# Patient Record
Sex: FEMALE | Race: WHITE | NOT HISPANIC OR LATINO | Employment: FULL TIME | URBAN - METROPOLITAN AREA
[De-identification: names, ages, dates, MRNs, and addresses within clinical notes are randomized per-mention and may not be internally consistent; named-entity substitution may affect disease eponyms.]

---

## 2017-03-03 ENCOUNTER — TELEPHONE (OUTPATIENT)
Dept: OBSTETRICS AND GYNECOLOGY | Age: 47
End: 2017-03-03

## 2017-05-08 ENCOUNTER — PROCEDURE VISIT (OUTPATIENT)
Dept: OBSTETRICS AND GYNECOLOGY | Age: 47
End: 2017-05-08

## 2017-05-08 ENCOUNTER — OFFICE VISIT (OUTPATIENT)
Dept: OBSTETRICS AND GYNECOLOGY | Age: 47
End: 2017-05-08

## 2017-05-08 VITALS
BODY MASS INDEX: 32.47 KG/M2 | SYSTOLIC BLOOD PRESSURE: 130 MMHG | HEIGHT: 66 IN | WEIGHT: 202 LBS | DIASTOLIC BLOOD PRESSURE: 88 MMHG

## 2017-05-08 DIAGNOSIS — Z30.431 IUD CHECK UP: Primary | ICD-10-CM

## 2017-05-08 DIAGNOSIS — Z30.430 ENCOUNTER FOR IUD INSERTION: Primary | ICD-10-CM

## 2017-05-08 DIAGNOSIS — N39.41 URGE INCONTINENCE: ICD-10-CM

## 2017-05-08 DIAGNOSIS — Z30.432 ENCOUNTER FOR IUD REMOVAL: ICD-10-CM

## 2017-05-08 LAB
B-HCG UR QL: NEGATIVE
INTERNAL NEGATIVE CONTROL: NEGATIVE
INTERNAL POSITIVE CONTROL: POSITIVE
Lab: NORMAL

## 2017-05-08 PROCEDURE — 99212 OFFICE O/P EST SF 10 MIN: CPT | Performed by: OBSTETRICS & GYNECOLOGY

## 2017-05-08 PROCEDURE — 58301 REMOVE INTRAUTERINE DEVICE: CPT | Performed by: OBSTETRICS & GYNECOLOGY

## 2017-05-08 PROCEDURE — 81025 URINE PREGNANCY TEST: CPT | Performed by: OBSTETRICS & GYNECOLOGY

## 2017-05-08 PROCEDURE — 58300 INSERT INTRAUTERINE DEVICE: CPT | Performed by: OBSTETRICS & GYNECOLOGY

## 2017-05-08 PROCEDURE — 76830 TRANSVAGINAL US NON-OB: CPT | Performed by: OBSTETRICS & GYNECOLOGY

## 2017-05-08 RX ORDER — TOLTERODINE 4 MG/1
4 CAPSULE, EXTENDED RELEASE ORAL DAILY
Qty: 30 CAPSULE | Refills: 2 | Status: SHIPPED | OUTPATIENT
Start: 2017-05-08 | End: 2018-04-04

## 2017-09-08 ENCOUNTER — OFFICE VISIT (OUTPATIENT)
Dept: OBSTETRICS AND GYNECOLOGY | Age: 47
End: 2017-09-08

## 2017-09-08 VITALS
WEIGHT: 203 LBS | BODY MASS INDEX: 32.62 KG/M2 | SYSTOLIC BLOOD PRESSURE: 140 MMHG | HEIGHT: 66 IN | DIASTOLIC BLOOD PRESSURE: 98 MMHG

## 2017-09-08 DIAGNOSIS — B96.89 BV (BACTERIAL VAGINOSIS): ICD-10-CM

## 2017-09-08 DIAGNOSIS — Z01.419 ENCOUNTER FOR GYNECOLOGICAL EXAMINATION WITHOUT ABNORMAL FINDING: Primary | ICD-10-CM

## 2017-09-08 DIAGNOSIS — N76.0 BV (BACTERIAL VAGINOSIS): ICD-10-CM

## 2017-09-08 PROCEDURE — 99396 PREV VISIT EST AGE 40-64: CPT | Performed by: OBSTETRICS & GYNECOLOGY

## 2017-09-08 RX ORDER — METRONIDAZOLE 500 MG/1
500 TABLET ORAL 2 TIMES DAILY
Qty: 14 TABLET | Refills: 0 | Status: SHIPPED | OUTPATIENT
Start: 2017-09-08 | End: 2017-09-15

## 2017-09-08 NOTE — PROGRESS NOTES
Routine Annual Visit    2017    Patient: Makenzie Justin          MR#:[id number]      Chief Complaint   Patient presents with   • Annual Exam     PT HERE FOR ROUTINE ANNUAL EXAM, HAD MG IN 2017. SHE IS DOING WELL WITH NO COMPLAINTS. LIKES LILETTA. LAST PAP  (NEG AND NEG HPV).       History of Present Illness    47 y.o. female  who presents for annual exam.   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 Patient is not currently having periods (Reason: Other).  Obstetric History:  OB History      Para Term  AB TAB SAB Ectopic Multiple Living    6 1 1  2  1   1         Menstrual History:     No LMP recorded. Patient is not currently having periods (Reason: Other).       Sexual History:       ________________________________________  Patient Active Problem List   Diagnosis   • Dysthymia   • Hyperglycemia       Past Medical History:   Diagnosis Date   • Allergic rhinitis    • Anxiety    • Depression    • GERD (gastroesophageal reflux disease)    • History of miscarriage    • Menorrhagia    • PMS (premenstrual syndrome)    • Stress at home    • Stress at work    • Vaginal delivery        Past Surgical History:   Procedure Laterality Date   • DILATATION AND CURETTAGE         History   Smoking Status   • Never Smoker   Smokeless Tobacco   • Never Used       has a current medication list which includes the following prescription(s): amoxicillin, benzonatate, cetirizine, escitalopram, fluvirin, guaifenesin-codeine, metronidazole, omeprazole magnesium, and tolterodine la, and the following Facility-Administered Medications: levonorgestrel.  ________________________________________    Current contraception: IUD  History of abnormal Pap smear: no  Family history of Breast cancer:   Family history of uterine or ovarian cancer: no  Family History of colon cancer/colon  "polyps: no  History of abnormal mammogram: no      The following portions of the patient's history were reviewed and updated as appropriate: allergies, current medications, past family history, past medical history, past social history, past surgical history and problem list.    Review of Systems    Pertinent items are noted in HPI.     Objective   Physical Exam    /98  Ht 66\" (167.6 cm)  Wt 203 lb (92.1 kg)  LMP Comment: LYNETTE  BMI 32.77 kg/m2   BP Readings from Last 3 Encounters:   09/08/17 140/98   05/08/17 130/88   12/20/16 124/80      Wt Readings from Last 3 Encounters:   09/08/17 203 lb (92.1 kg)   05/08/17 202 lb (91.6 kg)   12/20/16 186 lb (84.4 kg)      BMI: Estimated body mass index is 32.77 kg/(m^2) as calculated from the following:    Height as of this encounter: 66\" (167.6 cm).    Weight as of this encounter: 203 lb (92.1 kg).      General:   alert, appears stated age and cooperative   Abdomen: soft, non-tender, without masses or organomegaly   Breast: inspection negative, no nipple discharge or bleeding, no masses or nodularity palpable   Vulva: normal   Vagina: normal mucosa, ph normal, discharge appears normal, no odor   Cervix: no cervical motion tenderness and no lesions   Uterus: normal size, mobile or non-tender   Adnexa: no mass, fullness, tenderness     Assessment:    1. Normal annual exam   Assessment     ICD-10-CM ICD-9-CM   1. Encounter for gynecological examination without abnormal finding Z01.419 V72.31   2. BV (bacterial vaginosis) N76.0 616.10    B96.89 041.9     Plan:    Plan     []  Mammogram request made  []  PAP done  []  Labs:   []  GC/Chl/TV  []  DEXA scan   []  Referral for colonoscopy:       Makenzie was seen today for annual exam.    Diagnoses and all orders for this visit:    Encounter for gynecological examination without abnormal finding    BV (bacterial vaginosis)  -     metroNIDAZOLE (FLAGYL) 500 MG tablet; Take 1 tablet by mouth 2 (Two) Times a Day for 7 " days.            Counseling:  --Nutrition: Stressed importance of moderation and caloric balance, stressed fresh fruit and vegetables  --Exercise: Stressed the importance of regular exercise. 3-5 times weekly   - Discussed screening mammogram recommendations.   --Discussed benefits of screening colonoscopy- age 50 unless FH  --Discussed pap smear screening recommendations

## 2017-12-19 DIAGNOSIS — F34.1 DYSTHYMIA: ICD-10-CM

## 2017-12-19 RX ORDER — ESCITALOPRAM OXALATE 10 MG/1
TABLET ORAL
Qty: 30 TABLET | Refills: 0 | Status: SHIPPED | OUTPATIENT
Start: 2017-12-19 | End: 2017-12-22 | Stop reason: SDUPTHER

## 2017-12-22 DIAGNOSIS — F34.1 DYSTHYMIA: ICD-10-CM

## 2017-12-22 RX ORDER — ESCITALOPRAM OXALATE 10 MG/1
10 TABLET ORAL DAILY
Qty: 90 TABLET | Refills: 0 | Status: SHIPPED | OUTPATIENT
Start: 2017-12-22 | End: 2018-04-23 | Stop reason: SDUPTHER

## 2018-04-04 ENCOUNTER — OFFICE VISIT (OUTPATIENT)
Dept: SPORTS MEDICINE | Facility: CLINIC | Age: 48
End: 2018-04-04

## 2018-04-04 VITALS
HEART RATE: 89 BPM | WEIGHT: 196.8 LBS | HEIGHT: 66 IN | OXYGEN SATURATION: 96 % | DIASTOLIC BLOOD PRESSURE: 78 MMHG | BODY MASS INDEX: 31.63 KG/M2 | TEMPERATURE: 98.6 F | SYSTOLIC BLOOD PRESSURE: 136 MMHG

## 2018-04-04 DIAGNOSIS — Z91.09 ENVIRONMENTAL ALLERGIES: ICD-10-CM

## 2018-04-04 DIAGNOSIS — Z00.00 PREVENTATIVE HEALTH CARE: Primary | ICD-10-CM

## 2018-04-04 PROCEDURE — 99396 PREV VISIT EST AGE 40-64: CPT | Performed by: FAMILY MEDICINE

## 2018-04-04 RX ORDER — FEXOFENADINE HCL AND PSEUDOEPHEDRINE HCI 180; 240 MG/1; MG/1
1 TABLET, EXTENDED RELEASE ORAL DAILY
COMMUNITY
End: 2019-10-23

## 2018-04-04 NOTE — PROGRESS NOTES
"Makenzie Justin is here today for an annual physical exam.       - Has noted over past few years problems with perineal allergies. OTC       I have reviewed the patient's medical, family, and social history in detail and updated the computerized patient record.    Screening history:  Colonoscopy - n/a  Breast cancer, Pap/pelvic - per GYN  Metabolic - last year    Health Maintenance   Topic Date Due   • PAP SMEAR  03/03/2017   • INFLUENZA VACCINE  08/01/2017   • TDAP/TD VACCINES (2 - Td) 01/01/2020       Review of Systems   Constitutional: Negative for activity change, appetite change, chills, diaphoresis, fatigue, fever and unexpected weight change.   HENT: Positive for postnasal drip, rhinorrhea and sinus pressure. Negative for congestion, ear pain, sneezing, sore throat and trouble swallowing.    Eyes: Negative for visual disturbance.   Respiratory: Negative for cough, chest tightness, shortness of breath and wheezing.    Cardiovascular: Negative for chest pain and palpitations.   Gastrointestinal: Negative for abdominal pain, blood in stool, nausea and vomiting.   Endocrine: Negative for cold intolerance, polydipsia, polyphagia and polyuria.   Genitourinary: Negative for dysuria, flank pain, frequency, hematuria and urgency.   Musculoskeletal: Negative for arthralgias, back pain, joint swelling and myalgias.   Skin: Negative for rash.   Allergic/Immunologic: Negative for environmental allergies.   Neurological: Negative for dizziness, syncope, weakness, numbness and headaches.   Hematological: Negative for adenopathy. Does not bruise/bleed easily.   Psychiatric/Behavioral: Negative for agitation, decreased concentration, dysphoric mood, sleep disturbance and suicidal ideas. The patient is not nervous/anxious.        /78 (BP Location: Right arm, Patient Position: Sitting, Cuff Size: Adult)   Pulse 89   Temp 98.6 °F (37 °C) (Oral)   Ht 167.6 cm (65.98\")   Wt 89.3 kg (196 lb 12.8 oz)   SpO2 96%   BMI " 31.78 kg/m²      Physical Exam    Vital signs reviewed.  General appearance: No acute distress  Eyes: conjunctiva clear without erythema; pupils equally round and reactive  ENT: external ears and nose normal; hearing normal, oropharynx clear, changes of allergies.   Neck: supple; no thyromegaly  CV: normal rate and rhythm; no peripheral edema  Respiratory: normal respiratory effort; lungs clear to auscultation bilaterally  MSK: normal gait and station; no focal joint deformity or swelling  Skin: no rash or wounds; normal turgor  Neuro: cranial nerves 2-12 grossly intact; normal sensation to light touch  Psych: mood and affect normal; recent and remote memory intact    No visits with results within 2 Week(s) from this visit.   Latest known visit with results is:   Office Visit on 05/08/2017   Component Date Value Ref Range Status   • HCG, Urine, QL 05/08/2017 Negative  Negative Final   • Lot Number 05/08/2017 [id number]   Final   • Internal Positive Control 05/08/2017 Positive   Final   • Internal Negative Control 05/08/2017 Negative   Final       Makenzie was seen today for annual exam.    Diagnoses and all orders for this visit:    Preventative health care  -     Hemoglobin A1c  -     CBC & Differential  -     Comprehensive Metabolic Panel  -     Lipid Panel With / Chol / HDL Ratio  -     T4, Free  -     TSH  -     UA / M With / Rflx Culture([facility] ONLY) - Urine, Clean Catch    Environmental allergies  -     Ambulatory Referral to Allergy        Encourage healthy diet and exercise.  Encourage patient to stay up to date on screening examinations as indicated based on age and risk factors.    EMR Dragon/Transcription disclaimer:    Much of this encounter note is an electronic transcription/translation of spoken language to printed text.  The electronic translation of spoken language may permit erroneous, or at times, nonsensical words or phrases to be inadvertently transcribed.  Although I have reviewed the note for  such errors some may still exist.

## 2018-04-06 LAB
ALBUMIN SERPL-MCNC: 4.7 G/DL (ref 3.5–5.2)
ALBUMIN/GLOB SERPL: 2 G/DL
ALP SERPL-CCNC: 126 U/L (ref 39–117)
ALT SERPL-CCNC: 40 U/L (ref 1–33)
APPEARANCE UR: ABNORMAL
AST SERPL-CCNC: 22 U/L (ref 1–32)
BACTERIA #/AREA URNS HPF: ABNORMAL /HPF
BACTERIA UR CULT: NORMAL
BACTERIA UR CULT: NORMAL
BASOPHILS # BLD AUTO: 0.02 10*3/MM3 (ref 0–0.2)
BASOPHILS NFR BLD AUTO: 0.2 % (ref 0–1.5)
BILIRUB SERPL-MCNC: 0.4 MG/DL (ref 0.1–1.2)
BILIRUB UR QL STRIP: NEGATIVE
BUN SERPL-MCNC: 13 MG/DL (ref 6–20)
BUN/CREAT SERPL: 14.1 (ref 7–25)
CALCIUM SERPL-MCNC: 9.5 MG/DL (ref 8.6–10.5)
CHLORIDE SERPL-SCNC: 103 MMOL/L (ref 98–107)
CHOLEST SERPL-MCNC: 219 MG/DL (ref 0–200)
CHOLEST/HDLC SERPL: 4.87 {RATIO}
CO2 SERPL-SCNC: 29.6 MMOL/L (ref 22–29)
COLOR UR: YELLOW
CREAT SERPL-MCNC: 0.92 MG/DL (ref 0.57–1)
EOSINOPHIL # BLD AUTO: 0.08 10*3/MM3 (ref 0–0.7)
EOSINOPHIL NFR BLD AUTO: 0.9 % (ref 0.3–6.2)
EPI CELLS #/AREA URNS HPF: >10 /HPF
ERYTHROCYTE [DISTWIDTH] IN BLOOD BY AUTOMATED COUNT: 13.8 % (ref 11.7–13)
GFR SERPLBLD CREATININE-BSD FMLA CKD-EPI: 66 ML/MIN/1.73
GFR SERPLBLD CREATININE-BSD FMLA CKD-EPI: 80 ML/MIN/1.73
GLOBULIN SER CALC-MCNC: 2.3 GM/DL
GLUCOSE SERPL-MCNC: 103 MG/DL (ref 65–99)
GLUCOSE UR QL: NEGATIVE
HBA1C MFR BLD: 5.4 % (ref 4.8–5.6)
HCT VFR BLD AUTO: 43.5 % (ref 35.6–45.5)
HDLC SERPL-MCNC: 45 MG/DL (ref 40–60)
HGB BLD-MCNC: 14.3 G/DL (ref 11.9–15.5)
HGB UR QL STRIP: NEGATIVE
IMM GRANULOCYTES # BLD: 0.02 10*3/MM3 (ref 0–0.03)
IMM GRANULOCYTES NFR BLD: 0.2 % (ref 0–0.5)
KETONES UR QL STRIP: NEGATIVE
LDLC SERPL CALC-MCNC: 153 MG/DL (ref 0–100)
LEUKOCYTE ESTERASE UR QL STRIP: NEGATIVE
LYMPHOCYTES # BLD AUTO: 1.42 10*3/MM3 (ref 0.9–4.8)
LYMPHOCYTES NFR BLD AUTO: 16.5 % (ref 19.6–45.3)
MCH RBC QN AUTO: 29.5 PG (ref 26.9–32)
MCHC RBC AUTO-ENTMCNC: 32.9 G/DL (ref 32.4–36.3)
MCV RBC AUTO: 89.9 FL (ref 80.5–98.2)
MICRO URNS: ABNORMAL
MICRO URNS: ABNORMAL
MONOCYTES # BLD AUTO: 0.52 10*3/MM3 (ref 0.2–1.2)
MONOCYTES NFR BLD AUTO: 6.1 % (ref 5–12)
MUCOUS THREADS URNS QL MICRO: PRESENT /HPF
NEUTROPHILS # BLD AUTO: 6.53 10*3/MM3 (ref 1.9–8.1)
NEUTROPHILS NFR BLD AUTO: 76.1 % (ref 42.7–76)
NITRITE UR QL STRIP: NEGATIVE
PH UR STRIP: 7.5 [PH] (ref 5–7.5)
PLATELET # BLD AUTO: 302 10*3/MM3 (ref 140–500)
POTASSIUM SERPL-SCNC: 4.4 MMOL/L (ref 3.5–5.2)
PROT SERPL-MCNC: 7 G/DL (ref 6–8.5)
PROT UR QL STRIP: ABNORMAL
RBC # BLD AUTO: 4.84 10*6/MM3 (ref 3.9–5.2)
RBC #/AREA URNS HPF: ABNORMAL /HPF
SODIUM SERPL-SCNC: 143 MMOL/L (ref 136–145)
SP GR UR: 1.03 (ref 1–1.03)
T4 FREE SERPL-MCNC: 1.01 NG/DL (ref 0.93–1.7)
TRIGL SERPL-MCNC: 103 MG/DL (ref 0–150)
TSH SERPL DL<=0.005 MIU/L-ACNC: 1.72 MIU/ML (ref 0.27–4.2)
URINALYSIS REFLEX: ABNORMAL
UROBILINOGEN UR STRIP-MCNC: 0.2 MG/DL (ref 0.2–1)
VLDLC SERPL CALC-MCNC: 20.6 MG/DL (ref 5–40)
WBC # BLD AUTO: 8.59 10*3/MM3 (ref 4.5–10.7)
WBC #/AREA URNS HPF: ABNORMAL /HPF

## 2018-04-19 ENCOUNTER — TELEPHONE (OUTPATIENT)
Dept: SPORTS MEDICINE | Facility: CLINIC | Age: 48
End: 2018-04-19

## 2018-04-19 NOTE — TELEPHONE ENCOUNTER
Patient notified.    ----- Message from Robert Mendoza MD sent at 4/6/2018  9:02 AM EDT -----  Cholesterol has increased, recommend low-fat diet and increase walking by 30 minutes a day then repeat fasting lipids and CMP in 6-8 weeks.

## 2018-04-23 DIAGNOSIS — F34.1 DYSTHYMIA: ICD-10-CM

## 2018-04-23 RX ORDER — ESCITALOPRAM OXALATE 10 MG/1
10 TABLET ORAL DAILY
Qty: 90 TABLET | Refills: 0 | Status: SHIPPED | OUTPATIENT
Start: 2018-04-23 | End: 2018-07-14 | Stop reason: SDUPTHER

## 2018-04-23 RX ORDER — ESCITALOPRAM OXALATE 10 MG/1
TABLET ORAL
Qty: 90 TABLET | Refills: 0 | Status: CANCELLED | OUTPATIENT
Start: 2018-04-23

## 2018-05-15 DIAGNOSIS — F34.1 DYSTHYMIA: ICD-10-CM

## 2018-05-15 RX ORDER — ESCITALOPRAM OXALATE 10 MG/1
TABLET ORAL
Qty: 90 TABLET | Refills: 0 | Status: SHIPPED | OUTPATIENT
Start: 2018-05-15 | End: 2019-04-29 | Stop reason: SDUPTHER

## 2018-05-29 DIAGNOSIS — E78.9 ABNORMAL CHOLESTEROL TEST: Primary | ICD-10-CM

## 2018-07-14 DIAGNOSIS — F34.1 DYSTHYMIA: ICD-10-CM

## 2018-07-16 RX ORDER — ESCITALOPRAM OXALATE 10 MG/1
TABLET ORAL
Qty: 90 TABLET | Refills: 0 | Status: SHIPPED | OUTPATIENT
Start: 2018-07-16 | End: 2018-08-14 | Stop reason: SDUPTHER

## 2018-07-16 RX ORDER — ESCITALOPRAM OXALATE 10 MG/1
TABLET ORAL
Qty: 90 TABLET | Refills: 0 | OUTPATIENT
Start: 2018-07-16

## 2018-08-14 DIAGNOSIS — F34.1 DYSTHYMIA: ICD-10-CM

## 2018-08-15 RX ORDER — ESCITALOPRAM OXALATE 10 MG/1
TABLET ORAL
Qty: 90 TABLET | Refills: 0 | Status: SHIPPED | OUTPATIENT
Start: 2018-08-15 | End: 2019-01-19 | Stop reason: SDUPTHER

## 2019-01-19 DIAGNOSIS — F34.1 DYSTHYMIA: ICD-10-CM

## 2019-01-21 RX ORDER — ESCITALOPRAM OXALATE 10 MG/1
TABLET ORAL
Qty: 90 TABLET | Refills: 0 | Status: SHIPPED | OUTPATIENT
Start: 2019-01-21 | End: 2019-07-05 | Stop reason: SDUPTHER

## 2019-04-21 DIAGNOSIS — F34.1 DYSTHYMIA: ICD-10-CM

## 2019-04-22 RX ORDER — ESCITALOPRAM OXALATE 10 MG/1
TABLET ORAL
Qty: 90 TABLET | Refills: 0 | OUTPATIENT
Start: 2019-04-22

## 2019-04-29 DIAGNOSIS — F34.1 DYSTHYMIA: ICD-10-CM

## 2019-04-29 RX ORDER — ESCITALOPRAM OXALATE 10 MG/1
10 TABLET ORAL DAILY
Qty: 90 TABLET | Refills: 1 | Status: SHIPPED | OUTPATIENT
Start: 2019-04-29 | End: 2019-07-05 | Stop reason: ALTCHOICE

## 2019-04-29 NOTE — TELEPHONE ENCOUNTER
Patient scheduled a physical but is out of medication. Would like to know if you can send in her medication until she is able to see you. Please advise, thanks!

## 2019-07-05 ENCOUNTER — OFFICE VISIT (OUTPATIENT)
Dept: SPORTS MEDICINE | Facility: CLINIC | Age: 49
End: 2019-07-05

## 2019-07-05 VITALS
BODY MASS INDEX: 33.75 KG/M2 | WEIGHT: 210 LBS | DIASTOLIC BLOOD PRESSURE: 70 MMHG | OXYGEN SATURATION: 96 % | HEIGHT: 66 IN | SYSTOLIC BLOOD PRESSURE: 124 MMHG | HEART RATE: 73 BPM

## 2019-07-05 DIAGNOSIS — K21.9 GASTROESOPHAGEAL REFLUX DISEASE WITHOUT ESOPHAGITIS: ICD-10-CM

## 2019-07-05 DIAGNOSIS — M76.71 PERONEAL TENDINITIS OF RIGHT LOWER EXTREMITY: ICD-10-CM

## 2019-07-05 DIAGNOSIS — F34.1 DYSTHYMIA: ICD-10-CM

## 2019-07-05 DIAGNOSIS — Z00.00 PREVENTATIVE HEALTH CARE: Primary | ICD-10-CM

## 2019-07-05 PROBLEM — G43.109 OPHTHALMIC MIGRAINE: Status: ACTIVE | Noted: 2019-07-05

## 2019-07-05 LAB
ALBUMIN SERPL-MCNC: 4.5 G/DL (ref 3.5–5.2)
ALBUMIN/GLOB SERPL: 2 G/DL
ALP SERPL-CCNC: 141 U/L (ref 39–117)
ALT SERPL-CCNC: 19 U/L (ref 1–33)
AST SERPL-CCNC: 18 U/L (ref 1–32)
BASOPHILS # BLD AUTO: 0.04 10*3/MM3 (ref 0–0.2)
BASOPHILS NFR BLD AUTO: 0.5 % (ref 0–1.5)
BILIRUB SERPL-MCNC: 0.5 MG/DL (ref 0.2–1.2)
BUN SERPL-MCNC: 8 MG/DL (ref 6–20)
BUN/CREAT SERPL: 7.9 (ref 7–25)
CALCIUM SERPL-MCNC: 8.9 MG/DL (ref 8.6–10.5)
CHLORIDE SERPL-SCNC: 102 MMOL/L (ref 98–107)
CHOLEST SERPL-MCNC: 209 MG/DL (ref 0–200)
CO2 SERPL-SCNC: 27.7 MMOL/L (ref 22–29)
CREAT SERPL-MCNC: 1.01 MG/DL (ref 0.57–1)
EOSINOPHIL # BLD AUTO: 0.06 10*3/MM3 (ref 0–0.4)
EOSINOPHIL NFR BLD AUTO: 0.7 % (ref 0.3–6.2)
ERYTHROCYTE [DISTWIDTH] IN BLOOD BY AUTOMATED COUNT: 13.7 % (ref 12.3–15.4)
GLOBULIN SER CALC-MCNC: 2.2 GM/DL
GLUCOSE SERPL-MCNC: 107 MG/DL (ref 65–99)
HBA1C MFR BLD: 5.5 % (ref 4.8–5.6)
HCT VFR BLD AUTO: 41.1 % (ref 34–46.6)
HDLC SERPL-MCNC: 42 MG/DL (ref 40–60)
HGB BLD-MCNC: 13.3 G/DL (ref 12–15.9)
IMM GRANULOCYTES # BLD AUTO: 0.05 10*3/MM3 (ref 0–0.05)
IMM GRANULOCYTES NFR BLD AUTO: 0.6 % (ref 0–0.5)
LDLC SERPL CALC-MCNC: 148 MG/DL (ref 0–100)
LDLC/HDLC SERPL: 3.52 {RATIO}
LYMPHOCYTES # BLD AUTO: 1.56 10*3/MM3 (ref 0.7–3.1)
LYMPHOCYTES NFR BLD AUTO: 18.3 % (ref 19.6–45.3)
MCH RBC QN AUTO: 28.5 PG (ref 26.6–33)
MCHC RBC AUTO-ENTMCNC: 32.4 G/DL (ref 31.5–35.7)
MCV RBC AUTO: 88 FL (ref 79–97)
MONOCYTES # BLD AUTO: 0.65 10*3/MM3 (ref 0.1–0.9)
MONOCYTES NFR BLD AUTO: 7.6 % (ref 5–12)
NEUTROPHILS # BLD AUTO: 6.17 10*3/MM3 (ref 1.7–7)
NEUTROPHILS NFR BLD AUTO: 72.3 % (ref 42.7–76)
NRBC BLD AUTO-RTO: 0.1 /100 WBC (ref 0–0.2)
PLATELET # BLD AUTO: 301 10*3/MM3 (ref 140–450)
POTASSIUM SERPL-SCNC: 4 MMOL/L (ref 3.5–5.2)
PROT SERPL-MCNC: 6.7 G/DL (ref 6–8.5)
RBC # BLD AUTO: 4.67 10*6/MM3 (ref 3.77–5.28)
SODIUM SERPL-SCNC: 143 MMOL/L (ref 136–145)
T4 FREE SERPL-MCNC: 1.13 NG/DL (ref 0.93–1.7)
TRIGL SERPL-MCNC: 95 MG/DL (ref 0–150)
TSH SERPL DL<=0.005 MIU/L-ACNC: 3.44 MIU/ML (ref 0.27–4.2)
VLDLC SERPL CALC-MCNC: 19 MG/DL
WBC # BLD AUTO: 8.53 10*3/MM3 (ref 3.4–10.8)

## 2019-07-05 PROCEDURE — 99213 OFFICE O/P EST LOW 20 MIN: CPT | Performed by: FAMILY MEDICINE

## 2019-07-05 PROCEDURE — 99396 PREV VISIT EST AGE 40-64: CPT | Performed by: FAMILY MEDICINE

## 2019-07-05 RX ORDER — PANTOPRAZOLE SODIUM 40 MG/1
40 TABLET, DELAYED RELEASE ORAL DAILY
Qty: 30 TABLET | Refills: 11 | Status: SHIPPED | OUTPATIENT
Start: 2019-07-05 | End: 2020-06-25

## 2019-07-05 RX ORDER — BUPROPION HYDROCHLORIDE 300 MG/1
300 TABLET ORAL DAILY
Qty: 30 TABLET | Refills: 11 | Status: SHIPPED | OUTPATIENT
Start: 2019-07-05 | End: 2020-11-16 | Stop reason: ALTCHOICE

## 2019-07-05 NOTE — PROGRESS NOTES
"Makenzie Justin is here today for an annual physical exam.     Prilosec not helpful for GERD now, no alarm sxs.     Lexapro maybe not as good as it as before, still episodes of irritability. Decreased motivation, tired, grouchy.    Last October patient had a lateral ankle sprain right ankle.  Resolved relatively quickly but still has occasional discomfort over the posterior lateral malleolus with certain positions.  No locking or giving way of the ankle.  No swelling.  No paresthesias.  No areas of ecchymosis or bruising    PHQ-2 Depression Screening  Little interest or pleasure in doing things?  0   Feeling down, depressed, or hopeless?  0   PHQ-2 Total Score  0         I have reviewed the patient's medical, family, and social history in detail and updated the computerized patient record.    Screening history:  Colonoscopy - n/a  Breast cancer, Pap/pelvic - per GYN  Metabolic - last year    Health Maintenance   Topic Date Due   • PAP SMEAR  03/03/2017   • ANNUAL GYN EXAM  09/09/2018   • ANNUAL PHYSICAL  04/05/2019   • INFLUENZA VACCINE  08/01/2019   • TDAP/TD VACCINES (2 - Td) 01/01/2020       Review of Systems   Constitutional: Negative.    HENT: Negative.    Eyes: Negative.    Respiratory: Negative.    Cardiovascular: Negative.    Gastrointestinal:        GERD alarm symptoms   Endocrine: Negative.    Genitourinary: Negative.    Musculoskeletal:        Per HPI   Skin: Negative.    Allergic/Immunologic: Negative.    Neurological: Negative.    Hematological: Negative.    Psychiatric/Behavioral: Positive for decreased concentration, dysphoric mood and sleep disturbance.       /70 (BP Location: Left arm, Patient Position: Sitting, Cuff Size: Adult)   Pulse 73   Ht 167.6 cm (65.98\")   Wt 95.3 kg (210 lb)   SpO2 96%   BMI 33.91 kg/m²      Physical Exam    Vital signs reviewed.  General appearance: No acute distress  Eyes: conjunctiva clear without erythema; pupils equally round and reactive  ENT: external ears " and nose normal; hearing normal, oropharynx clear  Neck: supple; no thyromegaly  CV: normal rate and rhythm; no peripheral edema  Respiratory: normal respiratory effort; lungs clear to auscultation bilaterally  MSK: normal gait and station; no focal joint deformity or swelling.  The right ankle normal in general appearance, negative anterior drawer, normal talar tilt.  No pain with resisted foot eversion.  Motor 5 out of 5 neurovascular intact.  Skin: no rash or wounds; normal turgor  Neuro: cranial nerves 2-12 grossly intact; normal sensation to light touch  Psych: mood and affect normal; recent and remote memory intact    No visits with results within 2 Week(s) from this visit.   Latest known visit with results is:   Office Visit on 04/04/2018   Component Date Value Ref Range Status   • Hemoglobin A1C 04/04/2018 5.40  4.80 - 5.60 % Final    Comment: Hemoglobin A1C Ranges:  Increased Risk for Diabetes  5.7% to 6.4%  Diabetes                     >= 6.5%  Diabetic Goal                < 7.0%     • WBC 04/04/2018 8.59  4.50 - 10.70 10*3/mm3 Final   • RBC 04/04/2018 4.84  3.90 - 5.20 10*6/mm3 Final   • Hemoglobin 04/04/2018 14.3  11.9 - 15.5 g/dL Final   • Hematocrit 04/04/2018 43.5  35.6 - 45.5 % Final   • MCV 04/04/2018 89.9  80.5 - 98.2 fL Final   • MCH 04/04/2018 29.5  26.9 - 32.0 pg Final   • MCHC 04/04/2018 32.9  32.4 - 36.3 g/dL Final   • RDW 04/04/2018 13.8* 11.7 - 13.0 % Final   • Platelets 04/04/2018 302  140 - 500 10*3/mm3 Final   • Neutrophil Rel % 04/04/2018 76.1* 42.7 - 76.0 % Final   • Lymphocyte Rel % 04/04/2018 16.5* 19.6 - 45.3 % Final   • Monocyte Rel % 04/04/2018 6.1  5.0 - 12.0 % Final   • Eosinophil Rel % 04/04/2018 0.9  0.3 - 6.2 % Final   • Basophil Rel % 04/04/2018 0.2  0.0 - 1.5 % Final   • Neutrophils Absolute 04/04/2018 6.53  1.90 - 8.10 10*3/mm3 Final   • Lymphocytes Absolute 04/04/2018 1.42  0.90 - 4.80 10*3/mm3 Final   • Monocytes Absolute 04/04/2018 0.52  0.20 - 1.20 10*3/mm3 Final   •  Eosinophils Absolute 04/04/2018 0.08  0.00 - 0.70 10*3/mm3 Final   • Basophils Absolute 04/04/2018 0.02  0.00 - 0.20 10*3/mm3 Final   • Immature Granulocyte Rel % 04/04/2018 0.2  0.0 - 0.5 % Final   • Immature Grans Absolute 04/04/2018 0.02  0.00 - 0.03 10*3/mm3 Final   • Glucose 04/04/2018 103* 65 - 99 mg/dL Final   • BUN 04/04/2018 13  6 - 20 mg/dL Final   • Creatinine 04/04/2018 0.92  0.57 - 1.00 mg/dL Final   • eGFR Non  Am 04/04/2018 66  >60 mL/min/1.73 Final   • eGFR African Am 04/04/2018 80  >60 mL/min/1.73 Final   • BUN/Creatinine Ratio 04/04/2018 14.1  7.0 - 25.0 Final   • Sodium 04/04/2018 143  136 - 145 mmol/L Final   • Potassium 04/04/2018 4.4  3.5 - 5.2 mmol/L Final   • Chloride 04/04/2018 103  98 - 107 mmol/L Final   • Total CO2 04/04/2018 29.6* 22.0 - 29.0 mmol/L Final   • Calcium 04/04/2018 9.5  8.6 - 10.5 mg/dL Final   • Total Protein 04/04/2018 7.0  6.0 - 8.5 g/dL Final   • Albumin 04/04/2018 4.70  3.50 - 5.20 g/dL Final   • Globulin 04/04/2018 2.3  gm/dL Final   • A/G Ratio 04/04/2018 2.0  g/dL Final   • Total Bilirubin 04/04/2018 0.4  0.1 - 1.2 mg/dL Final   • Alkaline Phosphatase 04/04/2018 126* 39 - 117 U/L Final   • AST (SGOT) 04/04/2018 22  1 - 32 U/L Final   • ALT (SGPT) 04/04/2018 40* 1 - 33 U/L Final   • Total Cholesterol 04/04/2018 219* 0 - 200 mg/dL Final   • Triglycerides 04/04/2018 103  0 - 150 mg/dL Final   • HDL Cholesterol 04/04/2018 45  40 - 60 mg/dL Final   • VLDL Cholesterol 04/04/2018 20.6  5 - 40 mg/dL Final   • LDL Cholesterol  04/04/2018 153* 0 - 100 mg/dL Final   • Chol/HDL Ratio 04/04/2018 4.87   Final   • Free T4 04/04/2018 1.01  0.93 - 1.70 ng/dL Final   • TSH 04/04/2018 1.720  0.270 - 4.200 mIU/mL Final   • Specific Gravity, UA 04/04/2018 1.027  1.005 - 1.030 Final   • pH, UA 04/04/2018 7.5  5.0 - 7.5 Final   • Color, UA 04/04/2018 Yellow  Yellow Final   • Appearance, UA 04/04/2018 Cloudy* Clear Final   • Leukocytes, UA 04/04/2018 Negative  Negative Final   •  Protein 04/04/2018 Trace  Negative/Trace Final   • Glucose, UA 04/04/2018 Negative  Negative Final   • Ketones 04/04/2018 Negative  Negative Final   • Blood, UA 04/04/2018 Negative  Negative Final   • Bilirubin, UA 04/04/2018 Negative  Negative Final   • Urobilinogen, UA 04/04/2018 0.2  0.2 - 1.0 mg/dL Final   • Nitrite, UA 04/04/2018 Negative  Negative Final   • Microscopic Examination 04/04/2018 Comment   Final    Microscopic follows if indicated.   • Microscopic Examination 04/04/2018 See below:   Final    Microscopic was indicated and was performed.   • Urinalysis Reflex 04/04/2018 Comment   Final    This specimen has reflexed to a Urine Culture.   • WBC, UA 04/04/2018 0-5  0 - 5 /hpf Final   • RBC, UA 04/04/2018 0-2  0 - 2 /hpf Final   • Epithelial Cells (non renal) 04/04/2018 >10* 0 - 10 /hpf Final   • Mucus, UA 04/04/2018 Present  Not Estab. /hpf Final   • Bacteria, UA 04/04/2018 Many* None seen/Few /hpf Final   • Urine Culture 04/04/2018 Final report   Final   • Result 1 04/04/2018 Comment   Final    Comment: Culture shows less than 10,000 colony forming units of bacteria per  milliliter of urine. This colony count is not generally considered  to be clinically significant.           Current Outpatient Medications:   •  escitalopram (LEXAPRO) 10 MG tablet, Take 1 tablet by mouth Daily., Disp: 90 tablet, Rfl: 1  •  fexofenadine-pseudoephedrine (ALLEGRA-D 24) 180-240 MG per 24 hr tablet, Take 1 tablet by mouth Daily., Disp: , Rfl:   •  buPROPion XL (WELLBUTRIN XL) 300 MG 24 hr tablet, Take 1 tablet by mouth Daily., Disp: 30 tablet, Rfl: 11  •  pantoprazole (PROTONIX) 40 MG EC tablet, Take 1 tablet by mouth Daily., Disp: 30 tablet, Rfl: 11    Current Facility-Administered Medications:   •  Levonorgestrel (LILETTA) 18.6 MCG/DAY IUD, , Intrauterine, Once, Daria Castellanos MD    Makenzie was seen today for annual exam.    Diagnoses and all orders for this visit:    Preventative health care  -     Lipid Panel With LDL  / HDL Ratio  -     CBC & Differential  -     T4, Free  -     Comprehensive Metabolic Panel  -     TSH  -     Hemoglobin A1c  -     UA / M With / Rflx Culture([facility] ONLY) - Urine, Clean Catch  -     Ambulatory Referral to Gastroenterology    Gastroesophageal reflux disease without esophagitis  -     Ambulatory Referral to Gastroenterology  -     pantoprazole (PROTONIX) 40 MG EC tablet; Take 1 tablet by mouth Daily.    Dysthymia  -     buPROPion XL (WELLBUTRIN XL) 300 MG 24 hr tablet; Take 1 tablet by mouth Daily.          Will start Wellbutrin and taper off the Lexapro.  1 month.    Regards to the right ankle because of the location I think it is most likely peroneal tendinitis however I am unable to elicit any symptoms today.  Gave HEP for fibularis tendonitis.     Because of chronic GERD and need for PPI, will refer to gastroenterology    Encourage healthy diet and exercise.  Encourage patient to stay up to date on screening examinations as indicated based on age and risk factors.    EMR Dragon/Transcription disclaimer:    Much of this encounter note is an electronic transcription/translation of spoken language to printed text.  The electronic translation of spoken language may permit erroneous, or at times, nonsensical words or phrases to be inadvertently transcribed.  Although I have reviewed the note for such errors some may still exist.

## 2019-07-29 ENCOUNTER — TELEPHONE (OUTPATIENT)
Dept: SPORTS MEDICINE | Facility: CLINIC | Age: 49
End: 2019-07-29

## 2019-07-29 NOTE — TELEPHONE ENCOUNTER
Patient called and wanted to know if there is anything you recommend for the Wellbutrin. She has been taking it and she has had some side effects like Insomnia, dizziness, and dry mouth. She would like to know if this is normal and if she should continue taking it or if there is anything else that doesn't have any of these side effects.   Please advise,  Thank you!

## 2019-07-30 DIAGNOSIS — F34.1 DYSTHYMIA: Primary | ICD-10-CM

## 2019-07-30 RX ORDER — DESVENLAFAXINE 25 MG/1
25 TABLET, EXTENDED RELEASE ORAL DAILY
Qty: 30 TABLET | Refills: 2 | Status: SHIPPED | OUTPATIENT
Start: 2019-07-30 | End: 2019-10-21 | Stop reason: ALTCHOICE

## 2019-07-30 NOTE — TELEPHONE ENCOUNTER
I have sent medication to pharmacy, she does not have to taper off Wellbutrin she can just discontinue.  Follow-up with me 1 month.

## 2019-07-30 NOTE — TELEPHONE ENCOUNTER
Those are not uncommon side effects to have during the first 10 to 14 days however if they continue then they are probably not going to get better.  Therefore can discontinue Wellbutrin and start Pristiq 25 mg daily and follow-up 1 month

## 2019-07-30 NOTE — TELEPHONE ENCOUNTER
Called Makenzie Justin  She stats she would like to try the pristiq 25mg has been taking the wellbutrin for about a month now and not seeing a change

## 2019-10-21 DIAGNOSIS — F34.1 DYSTHYMIA: ICD-10-CM

## 2019-10-21 RX ORDER — ESCITALOPRAM OXALATE 10 MG/1
10 TABLET ORAL DAILY
Qty: 90 TABLET | Refills: 3 | Status: SHIPPED | OUTPATIENT
Start: 2019-10-21 | End: 2020-11-16 | Stop reason: ALTCHOICE

## 2019-10-23 ENCOUNTER — TELEPHONE (OUTPATIENT)
Dept: OBSTETRICS AND GYNECOLOGY | Age: 49
End: 2019-10-23

## 2019-10-23 ENCOUNTER — OFFICE VISIT (OUTPATIENT)
Dept: OBSTETRICS AND GYNECOLOGY | Age: 49
End: 2019-10-23

## 2019-10-23 VITALS
SYSTOLIC BLOOD PRESSURE: 116 MMHG | WEIGHT: 211 LBS | BODY MASS INDEX: 33.91 KG/M2 | DIASTOLIC BLOOD PRESSURE: 70 MMHG | HEIGHT: 66 IN

## 2019-10-23 DIAGNOSIS — Z12.39 SCREENING FOR BREAST CANCER: ICD-10-CM

## 2019-10-23 DIAGNOSIS — N90.7 VULVAR CYST: ICD-10-CM

## 2019-10-23 DIAGNOSIS — N64.4 BREAST PAIN, LEFT: Primary | ICD-10-CM

## 2019-10-23 PROCEDURE — 99214 OFFICE O/P EST MOD 30 MIN: CPT | Performed by: OBSTETRICS & GYNECOLOGY

## 2019-10-23 RX ORDER — MONTELUKAST SODIUM 10 MG/1
TABLET ORAL DAILY
Refills: 5 | COMMUNITY
Start: 2019-09-26

## 2019-10-23 RX ORDER — LEVOCETIRIZINE DIHYDROCHLORIDE 5 MG/1
TABLET, FILM COATED ORAL
COMMUNITY
Start: 2019-10-12

## 2019-10-23 NOTE — PROGRESS NOTES
GYN Visit    10/23/2019    Patient: Makenzie Justin          MR#:[id number]      Chief Complaint   Patient presents with   • Breast Pain     BREAST PAIN LEFT.   4 CHILDREN  TIFFANI 15, JOY, 13, SHEBA 11, JOVANNY 9       History of Present Illness    49 y.o. female  who presents for  2 new issues    1. Breast pain on left, no mass but significant for 2 weeks, some right pain but not as much  Unsure where in cycle as has a lilletta  Due for mammo as well  Caffeine but daily same amount, 1-2 cokes    2. Spot on labia, little cyst, non tender, comes and goes    Scheduled for AE in about a month  Shin is 17- freshman at [employer],plays soccer  Sheba is 11, Joy is 13, Jovanny 9        No LMP recorded. Patient is not currently having periods (Reason: Other).    ________________________________________  Patient Active Problem List   Diagnosis   • Dysthymia   • Hyperglycemia   • Gastroesophageal reflux disease without esophagitis   • Ophthalmic migraine       Past Medical History:   Diagnosis Date   • Allergic rhinitis    • Anxiety    • Depression    • GERD (gastroesophageal reflux disease)    • History of miscarriage    • Menorrhagia    • PMS (premenstrual syndrome)    • Stress at home    • Stress at work    • Vaginal delivery        Past Surgical History:   Procedure Laterality Date   • DILATATION AND CURETTAGE         Social History     Tobacco Use   Smoking Status Never Smoker   Smokeless Tobacco Never Used       has a current medication list which includes the following prescription(s): levocetirizine, montelukast, pantoprazole, bupropion xl, and escitalopram, and the following Facility-Administered Medications: levonorgestrel.  ________________________________________    Current contraception: IUD      The following portions of the patient's history were reviewed and updated as appropriate: allergies, current medications, past family history, past medical history, past social history, past surgical history and problem  "list.    Review of Systems   Constitutional: Negative for fatigue and fever.   Gastrointestinal: Negative for nausea and vomiting.   Genitourinary: Negative for genital sores and pelvic pain.   Psychiatric/Behavioral: Negative for dysphoric mood. The patient is not nervous/anxious.    All other systems reviewed and are negative.      Pertinent items are noted in HPI.     Objective   Physical Exam    /70   Ht 167.6 cm (65.98\")   Wt 95.7 kg (211 lb)   Breastfeeding? No   BMI 34.08 kg/m²    BP Readings from Last 3 Encounters:   10/23/19 116/70   07/05/19 124/70   04/04/18 136/78      Wt Readings from Last 3 Encounters:   10/23/19 95.7 kg (211 lb)   07/05/19 95.3 kg (210 lb)   04/04/18 89.3 kg (196 lb 12.8 oz)      BMI: Estimated body mass index is 34.08 kg/m² as calculated from the following:    Height as of this encounter: 167.6 cm (65.98\").    Weight as of this encounter: 95.7 kg (211 lb).    Neck: no adenopathy, supple, symmetrical, trachea midline and thyroid not enlarged, symmetric, no tenderness/mass/nodules  Lungs: clear to auscultation bilaterally  Extremities: extremities normal, atraumatic, no cyanosis or edema  Skin: Skin color, texture, turgor normal. No rashes or lesions  Neurologic: Grossly normal  General:   alert, appears stated age and cooperative   Abdomen: soft, non-tender, without masses or organomegaly   Breast: inspection negative, no nipple discharge or bleeding, no masses or nodularity palpable   Vulva: normal, small white dot inclusion cyst on right minora and not inflamed at all and nontender                     Assessment:      Makenzie was seen today for breast pain.    Diagnoses and all orders for this visit:    Breast pain, left  -     Mammo Diagnostic Bilateral With CAD; Future  -     US Breast Left Complete; Future    Screening for breast cancer  -     Mammo Diagnostic Bilateral With CAD; Future    Vulvar cyst    inclusion cyst normal, reassured pt    Fu at AE 1 month and repeat " breast check, breast exam negative today

## 2019-10-23 NOTE — TELEPHONE ENCOUNTER
Dr MUHAMMAD pt exp bilateral breast pain but kamryn on Left side, requests to be seen today, there was a 1015 today w/Dr MUHAMMAD - scheduled/rm

## 2019-10-26 DIAGNOSIS — F34.1 DYSTHYMIA: ICD-10-CM

## 2019-10-28 RX ORDER — DESVENLAFAXINE 25 MG/1
25 TABLET, EXTENDED RELEASE ORAL DAILY
Qty: 30 TABLET | Refills: 5 | Status: SHIPPED | OUTPATIENT
Start: 2019-10-28 | End: 2020-04-27

## 2019-11-20 ENCOUNTER — OFFICE VISIT (OUTPATIENT)
Dept: OBSTETRICS AND GYNECOLOGY | Age: 49
End: 2019-11-20

## 2019-11-20 VITALS
DIASTOLIC BLOOD PRESSURE: 80 MMHG | BODY MASS INDEX: 33.75 KG/M2 | SYSTOLIC BLOOD PRESSURE: 124 MMHG | HEIGHT: 66 IN | WEIGHT: 210 LBS

## 2019-11-20 DIAGNOSIS — Z12.4 PAP SMEAR FOR CERVICAL CANCER SCREENING: ICD-10-CM

## 2019-11-20 DIAGNOSIS — Z01.419 ENCOUNTER FOR GYNECOLOGICAL EXAMINATION (GENERAL) (ROUTINE) WITHOUT ABNORMAL FINDINGS: Primary | ICD-10-CM

## 2019-11-20 DIAGNOSIS — Z11.51 SCREENING FOR HPV (HUMAN PAPILLOMAVIRUS): ICD-10-CM

## 2019-11-20 PROCEDURE — 99396 PREV VISIT EST AGE 40-64: CPT | Performed by: OBSTETRICS & GYNECOLOGY

## 2019-11-20 RX ORDER — CHLORCYCLIZINE HYDROCHLORIDE AND PSEUDOEPHEDRINE HYDROCHLORIDE 25; 60 MG/1; MG/1
TABLET ORAL
Refills: 3 | COMMUNITY
Start: 2019-11-14 | End: 2021-05-25

## 2019-11-20 NOTE — PROGRESS NOTES
Routine Annual Visit    2019    Patient: Makenzie Justin          MR#:[id number]      Chief Complaint   Patient presents with   • Gynecologic Exam     AE TODAY.  MG IN 10/23/2019.  SEEN RECENTLY FOR BREAST PAIN.         History of Present Illness    49 y.o. female  who presents for annual exam.   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 Patient is not currently having periods (Reason: Other).  Obstetric History:  OB History      Para Term  AB Living    6 1 1   2 4    SAB TAB Ectopic Molar Multiple Live Births    1         4         Menstrual History:     No LMP recorded. Patient is not currently having periods (Reason: Other).       Sexual History:       ________________________________________  Patient Active Problem List   Diagnosis   • Dysthymia   • Hyperglycemia   • Gastroesophageal reflux disease without esophagitis   • Ophthalmic migraine       Past Medical History:   Diagnosis Date   • Allergic rhinitis    • Anxiety    • Depression    • GERD (gastroesophageal reflux disease)    • History of miscarriage    • IUD (intrauterine device) in place    • Menorrhagia    • PMS (premenstrual syndrome)    • Stress at home    • Stress at work    • Vaginal delivery        Past Surgical History:   Procedure Laterality Date   • DILATATION AND CURETTAGE         Social History     Tobacco Use   Smoking Status Never Smoker   Smokeless Tobacco Never Used       has a current medication list which includes the following prescription(s): desvenlafaxine succinate er, levocetirizine, montelukast, pantoprazole, stahist ad, bupropion xl, and escitalopram, and the following Facility-Administered Medications: levonorgestrel.  ________________________________________    Current contraception: IUD  History of abnormal Pap smear:  "no  Family history of Breast cancer: no  Family history of uterine or ovarian cancer: no  Family History of colon cancer/colon polyps: no  History of abnormal mammogram: no      The following portions of the patient's history were reviewed and updated as appropriate: allergies, current medications, past family history, past medical history, past social history, past surgical history and problem list.    Review of Systems    Pertinent items are noted in HPI.     Objective   Physical Exam    /80   Ht 167.6 cm (65.98\")   Wt 95.3 kg (210 lb)   Breastfeeding? No Comment: IUD  BMI 33.92 kg/m²    BP Readings from Last 3 Encounters:   11/20/19 124/80   10/23/19 116/70   07/05/19 124/70      Wt Readings from Last 3 Encounters:   11/20/19 95.3 kg (210 lb)   10/23/19 95.7 kg (211 lb)   07/05/19 95.3 kg (210 lb)      BMI: Estimated body mass index is 33.92 kg/m² as calculated from the following:    Height as of this encounter: 167.6 cm (65.98\").    Weight as of this encounter: 95.3 kg (210 lb).      General:   alert, appears stated age and cooperative   Abdomen: soft, non-tender, without masses or organomegaly   Breast: inspection negative, no nipple discharge or bleeding, no masses or nodularity palpable   Vulva: normal   Vagina: normal mucosa   Cervix: no cervical motion tenderness and no lesions   Uterus: normal size, mobile or non-tender   Adnexa: no mass, fullness, tenderness     Assessment:    1. Normal annual exam   Assessment     ICD-10-CM ICD-9-CM   1. Encounter for gynecological examination (general) (routine) without abnormal findings Z01.419 V72.31   2. Screening for HPV (human papillomavirus) Z11.51 V73.81   3. Pap smear for cervical cancer screening Z12.4 V76.2     Plan:    Plan     []  Mammogram request made  [x]  PAP done  []  Labs:   []  GC/Chl/TV  []  DEXA scan   []  Referral for colonoscopy:       Makenzie was seen today for gynecologic exam.    Diagnoses and all orders for this visit:    Encounter for " gynecological examination (general) (routine) without abnormal findings  -     PapIG, HPV, Rfx 16 / 18    Screening for HPV (human papillomavirus)  -     PapIG, HPV, Rfx 16 / 18    Pap smear for cervical cancer screening  -     PapIG, HPV, Rfx 16 / 18            Counseling:  --Nutrition: Stressed importance of moderation and caloric balance, stressed fresh fruit and vegetables  --Exercise: Stressed the importance of regular exercise. 3-5 times weekly   - Discussed screening mammogram recommendations.   --Discussed benefits of screening colonoscopy- age 45 unless FH  --Discussed pap smear screening recommendations

## 2019-11-24 LAB
CYTOLOGIST CVX/VAG CYTO: NORMAL
CYTOLOGY CVX/VAG DOC CYTO: NORMAL
CYTOLOGY CVX/VAG DOC THIN PREP: NORMAL
DX ICD CODE: NORMAL
HIV 1 & 2 AB SER-IMP: NORMAL
HPV I/H RISK 1 DNA CVX QL PROBE+SIG AMP: NEGATIVE
OTHER STN SPEC: NORMAL
STAT OF ADQ CVX/VAG CYTO-IMP: NORMAL

## 2020-04-25 DIAGNOSIS — F34.1 DYSTHYMIA: ICD-10-CM

## 2020-04-27 RX ORDER — DESVENLAFAXINE 25 MG/1
25 TABLET, EXTENDED RELEASE ORAL DAILY
Qty: 90 TABLET | Refills: 3 | Status: SHIPPED | OUTPATIENT
Start: 2020-04-27 | End: 2021-04-19

## 2020-06-25 DIAGNOSIS — K21.9 GASTROESOPHAGEAL REFLUX DISEASE WITHOUT ESOPHAGITIS: ICD-10-CM

## 2020-06-25 RX ORDER — PANTOPRAZOLE SODIUM 40 MG/1
40 TABLET, DELAYED RELEASE ORAL DAILY
Qty: 90 TABLET | Refills: 0 | Status: SHIPPED | OUTPATIENT
Start: 2020-06-25 | End: 2020-09-24

## 2020-07-17 ENCOUNTER — TELEPHONE (OUTPATIENT)
Dept: SPORTS MEDICINE | Facility: CLINIC | Age: 50
End: 2020-07-17

## 2020-07-17 NOTE — TELEPHONE ENCOUNTER
Patient called and wants to know if you can prescribe AMBIEN for her again. 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 Please advise, thank you!

## 2020-07-20 DIAGNOSIS — F51.01 PRIMARY INSOMNIA: Primary | ICD-10-CM

## 2020-07-20 RX ORDER — ZOLPIDEM TARTRATE 5 MG/1
TABLET ORAL
Qty: 30 TABLET | Refills: 0 | Status: SHIPPED | OUTPATIENT
Start: 2020-07-20 | End: 2021-05-25 | Stop reason: ALTCHOICE

## 2020-08-11 DIAGNOSIS — F51.01 PRIMARY INSOMNIA: ICD-10-CM

## 2020-08-13 ENCOUNTER — TELEPHONE (OUTPATIENT)
Dept: SPORTS MEDICINE | Facility: CLINIC | Age: 50
End: 2020-08-13

## 2020-08-13 RX ORDER — ZOLPIDEM TARTRATE 5 MG/1
TABLET ORAL
Qty: 30 TABLET | OUTPATIENT
Start: 2020-08-13

## 2020-08-13 NOTE — TELEPHONE ENCOUNTER
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 Please advise if we are able to do this. Thank you!

## 2020-09-24 DIAGNOSIS — K21.9 GASTROESOPHAGEAL REFLUX DISEASE WITHOUT ESOPHAGITIS: ICD-10-CM

## 2020-09-24 RX ORDER — PANTOPRAZOLE SODIUM 40 MG/1
40 TABLET, DELAYED RELEASE ORAL DAILY
Qty: 90 TABLET | Refills: 0 | Status: SHIPPED | OUTPATIENT
Start: 2020-09-24 | End: 2020-12-23

## 2020-11-16 ENCOUNTER — OFFICE VISIT (OUTPATIENT)
Dept: SPORTS MEDICINE | Facility: CLINIC | Age: 50
End: 2020-11-16

## 2020-11-16 VITALS
TEMPERATURE: 98.2 F | BODY MASS INDEX: 33.37 KG/M2 | SYSTOLIC BLOOD PRESSURE: 133 MMHG | DIASTOLIC BLOOD PRESSURE: 82 MMHG | OXYGEN SATURATION: 97 % | HEART RATE: 88 BPM | WEIGHT: 207.6 LBS | RESPIRATION RATE: 15 BRPM | HEIGHT: 66 IN

## 2020-11-16 DIAGNOSIS — R07.89 CHEST PRESSURE: ICD-10-CM

## 2020-11-16 DIAGNOSIS — F41.9 ANXIETY: ICD-10-CM

## 2020-11-16 DIAGNOSIS — R00.2 PALPITATIONS: Primary | ICD-10-CM

## 2020-11-16 PROCEDURE — 99214 OFFICE O/P EST MOD 30 MIN: CPT | Performed by: FAMILY MEDICINE

## 2020-11-16 RX ORDER — HYDROXYZINE PAMOATE 25 MG/1
CAPSULE ORAL
Qty: 30 CAPSULE | Refills: 11 | Status: SHIPPED | OUTPATIENT
Start: 2020-11-16 | End: 2021-05-25 | Stop reason: ALTCHOICE

## 2020-11-16 NOTE — PROGRESS NOTES
"Makenzie is a 50 y.o. year old female evaluation of a problem that is new to this examiner.    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  Usually wakes her from sleep.  She also states she has been very stressed and anxious with her job and her children rest of her family.  No diaphoresis nausea or vomiting.  Positive family history of CAD in her dad and congestive heart failure in her mom.  I does want a make sure my heart is okay.  I have reviewed the patient's medical, family, and social history in detail and updated the computerized patient record.    Review of Systems   Constitutional: Negative.    HENT: Negative.    Respiratory: Negative.    Cardiovascular: Positive for chest pain and palpitations. Negative for leg swelling.   Gastrointestinal: Negative.    Genitourinary: Negative.    Psychiatric/Behavioral: Negative for self-injury and suicidal ideas. The patient is nervous/anxious.        Vitals:    11/16/20 1429   BP: 133/82   BP Location: Left arm   Patient Position: Sitting   Cuff Size: Adult   Pulse: 88   Resp: 15   Temp: 98.2 °F (36.8 °C)   TempSrc: Oral   SpO2: 97%   Weight: 94.2 kg (207 lb 9.6 oz)   Height: 167.6 cm (66\")         Physical Exam  Vitals signs reviewed.   Constitutional:       Appearance: She is well-developed.   HENT:      Head: Normocephalic and atraumatic.   Eyes:      Conjunctiva/sclera: Conjunctivae normal.      Pupils: Pupils are equal, round, and reactive to light.   Cardiovascular:      Rate and Rhythm: Normal rate and regular rhythm.      Pulses: Normal pulses.      Heart sounds: Normal heart sounds.      Comments: No peripheral edema  Pulmonary:      Effort: Pulmonary effort is normal.   Skin:     General: Skin is warm " and dry.   Neurological:      Mental Status: She is alert and oriented to person, place, and time.   Psychiatric:         Behavior: Behavior normal.      Comments: Anxious           Current Outpatient Medications:   •  Desvenlafaxine Succinate ER 25 MG tablet sustained-release 24 hour, TAKE 1 TABLET BY MOUTH DAILY, Disp: 90 tablet, Rfl: 3  •  levocetirizine (XYZAL) 5 MG tablet, , Disp: , Rfl:   •  montelukast (SINGULAIR) 10 MG tablet, Take  by mouth Daily., Disp: , Rfl: 5  •  pantoprazole (PROTONIX) 40 MG EC tablet, TAKE 1 TABLET BY MOUTH DAILY, Disp: 90 tablet, Rfl: 0  •  hydrOXYzine pamoate (VISTARIL) 25 MG capsule, 1 HS, Disp: 30 capsule, Rfl: 11  •  STAHIST AD 25-60 MG tablet, TK 1 T PO Q 8 H PRF COG, Disp: , Rfl: 3  •  zolpidem (AMBIEN) 5 MG tablet, 1 hs prn sleep. No more than 5 consecutive nights, Disp: 30 tablet, Rfl: 0    Current Facility-Administered Medications:   •  Levonorgestrel (LILETTA) 18.6 MCG/DAY IUD, , Intrauterine, Once, Daria Castellanos MD     Diagnoses and all orders for this visit:    Palpitations  -     Adult Stress Echo W/ Cont or Stress Agent if Necessary Per Protocol; Future    Chest pressure  -     Adult Stress Echo W/ Cont or Stress Agent if Necessary Per Protocol; Future    Anxiety  -     hydrOXYzine pamoate (VISTARIL) 25 MG capsule; 1 HS         Could very well all be due to anxiety, possible sleep apnea but will check stress echo and follow-up afterwards.    EMR Dragon/Transcription disclaimer:    Much of this encounter note is an electronic transcription/translation of spoken language to printed text.  The electronic translation of spoken language may permit erroneous, or at times, nonsensical words or phrases to be inadvertently transcribed.  Although I have reviewed the note for such errors some may still exist.

## 2020-12-04 ENCOUNTER — HOSPITAL ENCOUNTER (OUTPATIENT)
Dept: CARDIOLOGY | Facility: HOSPITAL | Age: 50
Discharge: HOME | End: 2020-12-04
Admitting: FAMILY MEDICINE

## 2020-12-04 VITALS
HEART RATE: 86 BPM | DIASTOLIC BLOOD PRESSURE: 86 MMHG | HEIGHT: 66 IN | SYSTOLIC BLOOD PRESSURE: 132 MMHG | BODY MASS INDEX: 33.27 KG/M2 | WEIGHT: 207 LBS

## 2020-12-04 DIAGNOSIS — R07.89 CHEST PRESSURE: ICD-10-CM

## 2020-12-04 DIAGNOSIS — R00.2 PALPITATIONS: ICD-10-CM

## 2020-12-04 LAB
AORTIC ARCH: 2.4 CM
ASCENDING AORTA: 2.2 CM
BH CV ECHO MEAS - ACS: 1.8 CM
BH CV ECHO MEAS - AO ARCH DIAM (PROXIMAL TRANS.): 2.4 CM
BH CV ECHO MEAS - AO MAX PG: 7.8 MMHG
BH CV ECHO MEAS - AO MEAN PG: 4.1 MMHG
BH CV ECHO MEAS - AO ROOT AREA (BSA CORRECTED): 1.2
BH CV ECHO MEAS - AO ROOT AREA: 4.7 CM^2
BH CV ECHO MEAS - AO ROOT DIAM: 2.5 CM
BH CV ECHO MEAS - AO V2 MAX: 139.8 CM/SEC
BH CV ECHO MEAS - AO V2 MEAN: 91.4 CM/SEC
BH CV ECHO MEAS - AO V2 VTI: 28.5 CM
BH CV ECHO MEAS - ASC AORTA: 2.2 CM
BH CV ECHO MEAS - BSA(HAYCOCK): 2.1 M^2
BH CV ECHO MEAS - BSA: 2 M^2
BH CV ECHO MEAS - BZI_BMI: 33.4 KILOGRAMS/M^2
BH CV ECHO MEAS - BZI_METRIC_HEIGHT: 167.6 CM
BH CV ECHO MEAS - BZI_METRIC_WEIGHT: 93.9 KG
BH CV ECHO MEAS - EDV(MOD-SP4): 79 ML
BH CV ECHO MEAS - EDV(TEICH): 95.1 ML
BH CV ECHO MEAS - EF(CUBED): 58.5 %
BH CV ECHO MEAS - EF(MOD-BP): 54 %
BH CV ECHO MEAS - EF(MOD-SP4): 54.4 %
BH CV ECHO MEAS - EF(TEICH): 50.2 %
BH CV ECHO MEAS - ESV(MOD-SP4): 36 ML
BH CV ECHO MEAS - ESV(TEICH): 47.3 ML
BH CV ECHO MEAS - FS: 25.4 %
BH CV ECHO MEAS - IVS/LVPW: 1
BH CV ECHO MEAS - IVSD: 1 CM
BH CV ECHO MEAS - LAT PEAK E' VEL: 10.8 CM/SEC
BH CV ECHO MEAS - LV DIASTOLIC VOL/BSA (35-75): 38.9 ML/M^2
BH CV ECHO MEAS - LV MASS(C)D: 165.1 GRAMS
BH CV ECHO MEAS - LV MASS(C)DI: 81.4 GRAMS/M^2
BH CV ECHO MEAS - LV SYSTOLIC VOL/BSA (12-30): 17.7 ML/M^2
BH CV ECHO MEAS - LVIDD: 4.6 CM
BH CV ECHO MEAS - LVIDS: 3.4 CM
BH CV ECHO MEAS - LVLD AP4: 7.9 CM
BH CV ECHO MEAS - LVLS AP4: 5.9 CM
BH CV ECHO MEAS - LVOT AREA (M): 2.5 CM^2
BH CV ECHO MEAS - LVOT AREA: 2.5 CM^2
BH CV ECHO MEAS - LVOT DIAM: 1.8 CM
BH CV ECHO MEAS - LVPWD: 1 CM
BH CV ECHO MEAS - MED PEAK E' VEL: 8.4 CM/SEC
BH CV ECHO MEAS - MV A DUR: 0.09 SEC
BH CV ECHO MEAS - MV A MAX VEL: 86.3 CM/SEC
BH CV ECHO MEAS - MV DEC TIME: 0.18 SEC
BH CV ECHO MEAS - MV E MAX VEL: 92.6 CM/SEC
BH CV ECHO MEAS - MV E/A: 1.1
BH CV ECHO MEAS - MV MAX PG: 4.5 MMHG
BH CV ECHO MEAS - MV MEAN PG: 2.7 MMHG
BH CV ECHO MEAS - MV V2 MAX: 105.9 CM/SEC
BH CV ECHO MEAS - MV V2 MEAN: 80.3 CM/SEC
BH CV ECHO MEAS - MV V2 VTI: 29.9 CM
BH CV ECHO MEAS - PULM A REVS DUR: 0.08 SEC
BH CV ECHO MEAS - PULM A REVS VEL: 31.5 CM/SEC
BH CV ECHO MEAS - PULM DIAS VEL: 58.2 CM/SEC
BH CV ECHO MEAS - PULM S/D: 1.2
BH CV ECHO MEAS - PULM SYS VEL: 71.3 CM/SEC
BH CV ECHO MEAS - RAP SYSTOLE: 3 MMHG
BH CV ECHO MEAS - RVSP: 23.6 MMHG
BH CV ECHO MEAS - SI(AO): 66.5 ML/M^2
BH CV ECHO MEAS - SI(CUBED): 27.3 ML/M^2
BH CV ECHO MEAS - SI(MOD-SP4): 21.2 ML/M^2
BH CV ECHO MEAS - SI(TEICH): 23.6 ML/M^2
BH CV ECHO MEAS - SUP REN AO DIAM: 1.6 CM
BH CV ECHO MEAS - SV(AO): 134.9 ML
BH CV ECHO MEAS - SV(CUBED): 55.3 ML
BH CV ECHO MEAS - SV(MOD-SP4): 43 ML
BH CV ECHO MEAS - SV(TEICH): 47.8 ML
BH CV ECHO MEAS - TAPSE (>1.6): 2.2 CM
BH CV ECHO MEAS - TR MAX VEL: 226.7 CM/SEC
BH CV ECHO MEASUREMENTS AVERAGE E/E' RATIO: 9.65
BH CV STRESS BP STAGE 1: NORMAL
BH CV STRESS BP STAGE 2: NORMAL
BH CV STRESS DURATION MIN STAGE 1: 3
BH CV STRESS DURATION MIN STAGE 2: 3
BH CV STRESS DURATION MIN STAGE 3: 1
BH CV STRESS DURATION SEC STAGE 1: 0
BH CV STRESS DURATION SEC STAGE 2: 0
BH CV STRESS DURATION SEC STAGE 3: 30
BH CV STRESS ECHO POST STRESS EJECTION FRACTION EF: 68 %
BH CV STRESS GRADE STAGE 1: 10
BH CV STRESS GRADE STAGE 2: 12
BH CV STRESS GRADE STAGE 3: 14
BH CV STRESS HR STAGE 1: 122
BH CV STRESS HR STAGE 2: 136
BH CV STRESS HR STAGE 3: 150
BH CV STRESS METS STAGE 1: 5
BH CV STRESS METS STAGE 2: 7.5
BH CV STRESS METS STAGE 3: 10
BH CV STRESS PROTOCOL 1: NORMAL
BH CV STRESS SPEED STAGE 1: 1.7
BH CV STRESS SPEED STAGE 2: 2.5
BH CV STRESS SPEED STAGE 3: 3.4
BH CV STRESS STAGE 1: 1
BH CV STRESS STAGE 2: 2
BH CV STRESS STAGE 3: 3
BH CV XLRA - RV BASE: 2.8 CM
BH CV XLRA - RV LENGTH: 6.9 CM
BH CV XLRA - RV MID: 2 CM
BH CV XLRA - TDI S': 13.9 CM/SEC
LEFT ATRIUM VOLUME INDEX: 14 ML/M2
LV EF 2D ECHO EST: 55 %
MAXIMAL PREDICTED HEART RATE: 172 BPM
PERCENT MAX PREDICTED HR: 87.21 %
SINUS: 2.3 CM
STJ: 2.4 CM
STRESS BASELINE BP: NORMAL MMHG
STRESS BASELINE HR: 86 BPM
STRESS PERCENT HR: 103 %
STRESS POST ESTIMATED WORKLOAD: 9 METS
STRESS POST EXERCISE DUR MIN: 7 MIN
STRESS POST EXERCISE DUR SEC: 30 SEC
STRESS POST PEAK BP: NORMAL MMHG
STRESS POST PEAK HR: 150 BPM
STRESS TARGET HR: 146 BPM

## 2020-12-04 PROCEDURE — 93325 DOPPLER ECHO COLOR FLOW MAPG: CPT

## 2020-12-04 PROCEDURE — 25010000002 PERFLUTREN (DEFINITY) 8.476 MG IN SODIUM CHLORIDE 0.9 % 10 ML INJECTION: Performed by: FAMILY MEDICINE

## 2020-12-04 PROCEDURE — 93016 CV STRESS TEST SUPVJ ONLY: CPT | Performed by: INTERNAL MEDICINE

## 2020-12-04 PROCEDURE — 93018 CV STRESS TEST I&R ONLY: CPT | Performed by: INTERNAL MEDICINE

## 2020-12-04 PROCEDURE — 93350 STRESS TTE ONLY: CPT

## 2020-12-04 PROCEDURE — 93350 STRESS TTE ONLY: CPT | Performed by: INTERNAL MEDICINE

## 2020-12-04 PROCEDURE — 93325 DOPPLER ECHO COLOR FLOW MAPG: CPT | Performed by: INTERNAL MEDICINE

## 2020-12-04 PROCEDURE — 93320 DOPPLER ECHO COMPLETE: CPT

## 2020-12-04 PROCEDURE — 93320 DOPPLER ECHO COMPLETE: CPT | Performed by: INTERNAL MEDICINE

## 2020-12-04 PROCEDURE — 93352 ADMIN ECG CONTRAST AGENT: CPT | Performed by: INTERNAL MEDICINE

## 2020-12-04 PROCEDURE — 93017 CV STRESS TEST TRACING ONLY: CPT

## 2020-12-04 RX ADMIN — PERFLUTREN 3 ML: 6.52 INJECTION, SUSPENSION INTRAVENOUS at 11:30

## 2020-12-08 ENCOUNTER — TELEPHONE (OUTPATIENT)
Dept: SPORTS MEDICINE | Facility: CLINIC | Age: 50
End: 2020-12-08

## 2020-12-08 NOTE — TELEPHONE ENCOUNTER
I called patient and LVM for patient to give us a call to let us know where she got her Stress Echo done. I informed her that I could not find in her chart that we had that yet. But I would give the office a call once she relays that information.

## 2020-12-15 ENCOUNTER — OFFICE VISIT (OUTPATIENT)
Dept: SPORTS MEDICINE | Facility: CLINIC | Age: 50
End: 2020-12-15

## 2020-12-15 VITALS
BODY MASS INDEX: 33.43 KG/M2 | HEIGHT: 66 IN | DIASTOLIC BLOOD PRESSURE: 78 MMHG | WEIGHT: 208 LBS | HEART RATE: 87 BPM | OXYGEN SATURATION: 96 % | SYSTOLIC BLOOD PRESSURE: 124 MMHG | TEMPERATURE: 97.3 F

## 2020-12-15 DIAGNOSIS — Z11.59 NEED FOR HEPATITIS C SCREENING TEST: ICD-10-CM

## 2020-12-15 DIAGNOSIS — Z12.11 SCREENING FOR COLON CANCER: ICD-10-CM

## 2020-12-15 DIAGNOSIS — K21.9 GASTROESOPHAGEAL REFLUX DISEASE WITHOUT ESOPHAGITIS: ICD-10-CM

## 2020-12-15 DIAGNOSIS — Z00.00 PREVENTATIVE HEALTH CARE: Primary | ICD-10-CM

## 2020-12-15 PROCEDURE — 99396 PREV VISIT EST AGE 40-64: CPT | Performed by: FAMILY MEDICINE

## 2020-12-15 NOTE — PROGRESS NOTES
Makenzie Justin is here today for an annual physical exam.     Eating a healthy diet. Exercising has dropped off some due to weather.     PHQ-2 Depression Screening  Little interest or pleasure in doing things? 0   Feeling down, depressed, or hopeless? 0   PHQ-2 Total Score 0         I have reviewed the patient's medical, family, and social history in detail and updated the computerized patient record.    Screening history:  Colonoscopy - due  Breast cancer, Pap/pelvic - per GYN  Metabolic - last year    Health Maintenance   Topic Date Due   • COLONOSCOPY  1970   • HEPATITIS C SCREENING  03/03/2017   • TDAP/TD VACCINES (2 - Td) 01/01/2020   • ANNUAL PHYSICAL  07/06/2020   • Annual Gynecologic Pelvic and Breast Exam  11/21/2020   • PAP SMEAR  11/20/2022   • INFLUENZA VACCINE  Completed   • Pneumococcal Vaccine 0-64  Aged Out       Review of Systems   Constitutional: Negative for activity change, appetite change, chills, diaphoresis, fatigue, fever and unexpected weight change.   HENT: Negative for congestion, ear pain, postnasal drip, rhinorrhea, sinus pressure, sneezing, sore throat and trouble swallowing.    Eyes: Negative for visual disturbance.   Respiratory: Negative for cough, chest tightness, shortness of breath and wheezing.    Cardiovascular: Negative for chest pain and palpitations.   Gastrointestinal: Negative for abdominal pain, blood in stool, nausea and vomiting.   Endocrine: Negative for cold intolerance, polydipsia, polyphagia and polyuria.   Genitourinary: Negative for dysuria, flank pain, frequency, hematuria and urgency.   Musculoskeletal: Negative for arthralgias, back pain, joint swelling and myalgias.   Skin: Negative for rash.   Allergic/Immunologic: Negative for environmental allergies.   Neurological: Negative for dizziness, syncope, weakness, numbness and headaches.   Hematological: Negative for adenopathy. Does not bruise/bleed easily.   Psychiatric/Behavioral: Negative for agitation,  "decreased concentration, dysphoric mood, sleep disturbance and suicidal ideas. The patient is not nervous/anxious.        /78 (BP Location: Left arm, Patient Position: Sitting, Cuff Size: Adult)   Pulse 87   Temp 97.3 °F (36.3 °C) (Temporal)   Ht 167.6 cm (65.98\")   Wt 94.3 kg (208 lb)   SpO2 96%   BMI 33.59 kg/m²      Physical Exam    Vital signs reviewed.  General appearance: No acute distress  Eyes: conjunctiva clear without erythema; pupils equally round and reactive  ENT: external ears normal; hearing normal  Neck: supple; no thyromegaly  CV: normal rate and rhythm; no peripheral edema  Respiratory: normal respiratory effort; lungs clear to auscultation bilaterally  MSK: normal gait and station; no focal joint deformity or swelling  Skin: no rash or wounds; normal turgor  Neuro: cranial nerves 2-12 grossly intact; normal sensation to light touch  Psych: mood and affect normal; recent and remote memory intact    Hospital Outpatient Visit on 12/04/2020   Component Date Value Ref Range Status   • Sinus 12/04/2020 2.3  cm Final   • STJ 12/04/2020 2.4  cm Final   • Ascending aorta 12/04/2020 2.2  cm Final   • Aortic arch 12/04/2020 2.4  cm Final   • LA Volume Index 12/04/2020 14.0  mL/m2 Final   • Abdo Ao Diam 12/04/2020 1.6  cm Final   • BH CV STRESS PROTOCOL 1 12/04/2020 Cr   Final   • Stage 1 12/04/2020 1   Final   • HR Stage 1 12/04/2020 122   Final   • BP Stage 1 12/04/2020 154/94   Final   • Duration Min Stage 1 12/04/2020 3   Final   • Duration Sec Stage 1 12/04/2020 0   Final   • Grade Stage 1 12/04/2020 10   Final   • Speed Stage 1 12/04/2020 1.7   Final   • BH CV STRESS METS STAGE 1 12/04/2020 5   Final   • Stage 2 12/04/2020 2   Final   • HR Stage 2 12/04/2020 136   Final   • BP Stage 2 12/04/2020 182/94   Final   • Duration Min Stage 2 12/04/2020 3   Final   • Duration Sec Stage 2 12/04/2020 0   Final   • Grade Stage 2 12/04/2020 12   Final   • Speed Stage 2 12/04/2020 2.5   Final   • BH " CV STRESS METS STAGE 2 12/04/2020 7.5   Final   • Stage 3 12/04/2020 3   Final   • HR Stage 3 12/04/2020 150   Final   • Duration Min Stage 3 12/04/2020 1   Final   • Duration Sec Stage 3 12/04/2020 30   Final   • Grade Stage 3 12/04/2020 14   Final   • Speed Stage 3 12/04/2020 3.4   Final   • BH CV STRESS METS STAGE 3 12/04/2020 10.0   Final   • Baseline HR 12/04/2020 86  bpm Final   • Baseline BP 12/04/2020 132/86  mmHg Final   • Peak HR 12/04/2020 150  bpm Final   • Peak BP 12/04/2020 182/94  mmHg Final   • Exercise duration (min) 12/04/2020 7  min Final   • Exercise duration (sec) 12/04/2020 30  sec Final   • BSA 12/04/2020 2.0  m^2 Final   • IVSd 12/04/2020 1.0  cm Final   • LVIDd 12/04/2020 4.6  cm Final   • LVIDs 12/04/2020 3.4  cm Final   • LVPWd 12/04/2020 1.0  cm Final   • IVS/LVPW 12/04/2020 1.0   Final   • FS 12/04/2020 25.4  % Final   • EDV(Teich) 12/04/2020 95.1  ml Final   • ESV(Teich) 12/04/2020 47.3  ml Final   • EF(Teich) 12/04/2020 50.2  % Final   • EF(cubed) 12/04/2020 58.5  % Final   • LV mass(C)d 12/04/2020 165.1  grams Final   • LV mass(C)dI 12/04/2020 81.4  grams/m^2 Final   • SV(Teich) 12/04/2020 47.8  ml Final   • SI(Teich) 12/04/2020 23.6  ml/m^2 Final   • SV(cubed) 12/04/2020 55.3  ml Final   • SI(cubed) 12/04/2020 27.3  ml/m^2 Final   • Ao root diam 12/04/2020 2.5  cm Final   • Ao root area 12/04/2020 4.7  cm^2 Final   • ACS 12/04/2020 1.8  cm Final   • asc Aorta Diam 12/04/2020 2.2  cm Final   • Ao Arch Diam (Proximal trans.) 12/04/2020 2.4  cm Final   • LVOT diam 12/04/2020 1.8  cm Final   • LVOT area 12/04/2020 2.5  cm^2 Final   • LVOT area(traced) 12/04/2020 2.5  cm^2 Final   • LVLd ap4 12/04/2020 7.9  cm Final   • EDV(MOD-sp4) 12/04/2020 79.0  ml Final   • LVLs ap4 12/04/2020 5.9  cm Final   • ESV(MOD-sp4) 12/04/2020 36.0  ml Final   • EF(MOD-sp4) 12/04/2020 54.4  % Final   • SV(MOD-sp4) 12/04/2020 43.0  ml Final   • SI(MOD-sp4) 12/04/2020 21.2  ml/m^2 Final   • Ao root area (BSA  corrected) 12/04/2020 1.2   Final   • LV Spence Vol (BSA corrected) 12/04/2020 38.9  ml/m^2 Final   • LV Sys Vol (BSA corrected) 12/04/2020 17.7  ml/m^2 Final   • TAPSE (>1.6) 12/04/2020 2.2  cm Final   • MV A dur 12/04/2020 0.09  sec Final   • MV E max quentin 12/04/2020 92.6  cm/sec Final   • MV A max quentin 12/04/2020 86.3  cm/sec Final   • MV E/A 12/04/2020 1.1   Final   • MV V2 max 12/04/2020 105.9  cm/sec Final   • MV max PG 12/04/2020 4.5  mmHg Final   • MV V2 mean 12/04/2020 80.3  cm/sec Final   • MV mean PG 12/04/2020 2.7  mmHg Final   • MV V2 VTI 12/04/2020 29.9  cm Final   • MV dec time 12/04/2020 0.18  sec Final   • Ao pk quentin 12/04/2020 139.8  cm/sec Final   • Ao max PG 12/04/2020 7.8  mmHg Final   • Ao V2 mean 12/04/2020 91.4  cm/sec Final   • Ao mean PG 12/04/2020 4.1  mmHg Final   • Ao V2 VTI 12/04/2020 28.5  cm Final   • SV(Ao) 12/04/2020 134.9  ml Final   • SI(Ao) 12/04/2020 66.5  ml/m^2 Final   • TR max quentin 12/04/2020 226.7  cm/sec Final   • RVSP(TR) 12/04/2020 23.6  mmHg Final   • RAP systole 12/04/2020 3.0  mmHg Final   • Pulm Sys Quentin 12/04/2020 71.3  cm/sec Final   • Pulm Spence Quentin 12/04/2020 58.2  cm/sec Final   • Pulm S/D 12/04/2020 1.2   Final   • Pulm A Revs Dur 12/04/2020 0.08  sec Final   • Pulm A Revs Quentin 12/04/2020 31.5  cm/sec Final   • RV Base 12/04/2020 2.8  cm Final   • RV Length 12/04/2020 6.9  cm Final   • RV Mid 12/04/2020 2.0  cm Final   • Lat Peak E' Quentin 12/04/2020 10.8  cm/sec Final   • Med Peak E' Quentin 12/04/2020 8.4  cm/sec Final   • RV S' 12/04/2020 13.9  cm/sec Final   •  CV ECHO ABBY - BZI_BMI 12/04/2020 33.4  kilograms/m^2 Final   •  CV ECHO ABBY - BSA(HAYCOCK) 12/04/2020 2.1  m^2 Final   •  CV ECHO ABBY - BZI_METRIC_WEIGHT 12/04/2020 93.9  kg Final   •  CV ECHO ABBY - BZI_METRIC_HEIGHT 12/04/2020 167.6  cm Final   • Percent Target HR 12/04/2020 103  % Final   • Avg E/e' ratio 12/04/2020 9.65   Final   • Percent Max Pred HR 12/04/2020 87.21  % Final   • PostStressEF  12/04/2020 68  % Final   • EF(MOD-bp) 12/04/2020 54  % Final   • Target HR (85%) 12/04/2020 146  bpm Final   • Max. Pred. HR (100%) 12/04/2020 172  bpm Final   • Estimated workload 12/04/2020 9.0  METS Final   • Echo EF Estimated 12/04/2020 55  % Final         Current Outpatient Medications:   •  Desvenlafaxine Succinate ER 25 MG tablet sustained-release 24 hour, TAKE 1 TABLET BY MOUTH DAILY, Disp: 90 tablet, Rfl: 3  •  hydrOXYzine pamoate (VISTARIL) 25 MG capsule, 1 HS, Disp: 30 capsule, Rfl: 11  •  levocetirizine (XYZAL) 5 MG tablet, , Disp: , Rfl:   •  montelukast (SINGULAIR) 10 MG tablet, Take  by mouth Daily., Disp: , Rfl: 5  •  pantoprazole (PROTONIX) 40 MG EC tablet, TAKE 1 TABLET BY MOUTH DAILY, Disp: 90 tablet, Rfl: 0  •  STAHIST AD 25-60 MG tablet, TK 1 T PO Q 8 H PRF COG, Disp: , Rfl: 3  •  zolpidem (AMBIEN) 5 MG tablet, 1 hs prn sleep. No more than 5 consecutive nights, Disp: 30 tablet, Rfl: 0    Current Facility-Administered Medications:   •  Levonorgestrel (LILETTA) 18.6 MCG/DAY IUD, , Intrauterine, Once, Daria Castellanos MD    Diagnoses and all orders for this visit:    1. Preventative health care (Primary)  -     Hemoglobin A1c  -     TSH  -     CBC & Differential  -     Lipid Panel With LDL / HDL Ratio  -     T4, Free  -     Urinalysis With Culture If Indicated - Urine, Clean Catch  -     Comprehensive Metabolic Panel    2. Screening for colon cancer  -     Ambulatory Referral to Gastroenterology    3. Gastroesophageal reflux disease without esophagitis  -     Ambulatory Referral to Gastroenterology    4. Need for hepatitis C screening test  -     Hepatitis C Antibody          Encourage healthy diet and exercise.  Encourage patient to stay up to date on screening examinations as indicated based on age and risk factors.    EMR Dragon/Transcription disclaimer:    Much of this encounter note is an electronic transcription/translation of spoken language to printed text.  The electronic translation of  spoken language may permit erroneous, or at times, nonsensical words or phrases to be inadvertently transcribed.  Although I have reviewed the note for such errors some may still exist.

## 2020-12-16 DIAGNOSIS — R29.818 SUSPECTED SLEEP APNEA: Primary | ICD-10-CM

## 2020-12-17 LAB
ALBUMIN SERPL-MCNC: 4.4 G/DL (ref 3.5–5.2)
ALBUMIN/GLOB SERPL: 1.7 G/DL
ALP SERPL-CCNC: 167 U/L (ref 39–117)
ALT SERPL-CCNC: 25 U/L (ref 1–33)
APPEARANCE UR: ABNORMAL
AST SERPL-CCNC: 22 U/L (ref 1–32)
BACTERIA #/AREA URNS HPF: ABNORMAL /HPF
BACTERIA UR CULT: NORMAL
BACTERIA UR CULT: NORMAL
BASOPHILS # BLD AUTO: 0.03 10*3/MM3 (ref 0–0.2)
BASOPHILS NFR BLD AUTO: 0.4 % (ref 0–1.5)
BILIRUB SERPL-MCNC: 0.6 MG/DL (ref 0–1.2)
BILIRUB UR QL STRIP: NEGATIVE
BUN SERPL-MCNC: 8 MG/DL (ref 6–20)
BUN/CREAT SERPL: 8.7 (ref 7–25)
CALCIUM SERPL-MCNC: 8.7 MG/DL (ref 8.6–10.5)
CHLORIDE SERPL-SCNC: 101 MMOL/L (ref 98–107)
CHOLEST SERPL-MCNC: 199 MG/DL (ref 0–200)
CO2 SERPL-SCNC: 29.9 MMOL/L (ref 22–29)
COLOR UR: YELLOW
CREAT SERPL-MCNC: 0.92 MG/DL (ref 0.57–1)
CRYSTALS URNS MICRO: ABNORMAL
EOSINOPHIL # BLD AUTO: 0.05 10*3/MM3 (ref 0–0.4)
EOSINOPHIL NFR BLD AUTO: 0.6 % (ref 0.3–6.2)
EPI CELLS #/AREA URNS HPF: >10 /HPF (ref 0–10)
ERYTHROCYTE [DISTWIDTH] IN BLOOD BY AUTOMATED COUNT: 13.1 % (ref 12.3–15.4)
GLOBULIN SER CALC-MCNC: 2.6 GM/DL
GLUCOSE SERPL-MCNC: 98 MG/DL (ref 65–99)
GLUCOSE UR QL: NEGATIVE
HBA1C MFR BLD: 5.3 % (ref 4.8–5.6)
HCT VFR BLD AUTO: 40.4 % (ref 34–46.6)
HCV AB S/CO SERPL IA: <0.1 S/CO RATIO (ref 0–0.9)
HDLC SERPL-MCNC: 37 MG/DL (ref 40–60)
HGB BLD-MCNC: 13.5 G/DL (ref 12–15.9)
HGB UR QL STRIP: NEGATIVE
IMM GRANULOCYTES # BLD AUTO: 0.07 10*3/MM3 (ref 0–0.05)
IMM GRANULOCYTES NFR BLD AUTO: 0.9 % (ref 0–0.5)
KETONES UR QL STRIP: NEGATIVE
LDLC SERPL CALC-MCNC: 136 MG/DL (ref 0–100)
LDLC/HDLC SERPL: 3.61 {RATIO}
LEUKOCYTE ESTERASE UR QL STRIP: ABNORMAL
LYMPHOCYTES # BLD AUTO: 1.11 10*3/MM3 (ref 0.7–3.1)
LYMPHOCYTES NFR BLD AUTO: 13.8 % (ref 19.6–45.3)
MCH RBC QN AUTO: 27.9 PG (ref 26.6–33)
MCHC RBC AUTO-ENTMCNC: 33.4 G/DL (ref 31.5–35.7)
MCV RBC AUTO: 83.5 FL (ref 79–97)
MICRO URNS: ABNORMAL
MONOCYTES # BLD AUTO: 0.63 10*3/MM3 (ref 0.1–0.9)
MONOCYTES NFR BLD AUTO: 7.8 % (ref 5–12)
MUCOUS THREADS URNS QL MICRO: PRESENT /HPF
NEUTROPHILS # BLD AUTO: 6.16 10*3/MM3 (ref 1.7–7)
NEUTROPHILS NFR BLD AUTO: 76.5 % (ref 42.7–76)
NITRITE UR QL STRIP: NEGATIVE
NRBC BLD AUTO-RTO: 0 /100 WBC (ref 0–0.2)
PH UR STRIP: 5.5 [PH] (ref 5–7.5)
PLATELET # BLD AUTO: 348 10*3/MM3 (ref 140–450)
POTASSIUM SERPL-SCNC: 3.4 MMOL/L (ref 3.5–5.2)
PROT SERPL-MCNC: 7 G/DL (ref 6–8.5)
PROT UR QL STRIP: ABNORMAL
RBC # BLD AUTO: 4.84 10*6/MM3 (ref 3.77–5.28)
RBC #/AREA URNS HPF: ABNORMAL /HPF (ref 0–2)
SODIUM SERPL-SCNC: 142 MMOL/L (ref 136–145)
SP GR UR: 1.02 (ref 1–1.03)
T4 FREE SERPL-MCNC: 1.16 NG/DL (ref 0.93–1.7)
TRIGL SERPL-MCNC: 142 MG/DL (ref 0–150)
TSH SERPL DL<=0.005 MIU/L-ACNC: 2.48 UIU/ML (ref 0.27–4.2)
UNIDENT CRYS URNS QL MICRO: PRESENT /LPF
URINALYSIS REFLEX: ABNORMAL
UROBILINOGEN UR STRIP-MCNC: 0.2 MG/DL (ref 0.2–1)
VLDLC SERPL CALC-MCNC: 26 MG/DL (ref 5–40)
WBC # BLD AUTO: 8.05 10*3/MM3 (ref 3.4–10.8)
WBC #/AREA URNS HPF: ABNORMAL /HPF (ref 0–5)

## 2020-12-23 DIAGNOSIS — K21.9 GASTROESOPHAGEAL REFLUX DISEASE WITHOUT ESOPHAGITIS: ICD-10-CM

## 2020-12-23 RX ORDER — PANTOPRAZOLE SODIUM 40 MG/1
40 TABLET, DELAYED RELEASE ORAL DAILY
Qty: 90 TABLET | Refills: 3 | Status: SHIPPED | OUTPATIENT
Start: 2020-12-23 | End: 2021-05-26 | Stop reason: SDUPTHER

## 2021-01-05 ENCOUNTER — APPOINTMENT (OUTPATIENT)
Dept: WOMENS IMAGING | Facility: HOSPITAL | Age: 51
End: 2021-01-05

## 2021-01-05 ENCOUNTER — OFFICE VISIT (OUTPATIENT)
Dept: OBSTETRICS AND GYNECOLOGY | Age: 51
End: 2021-01-05

## 2021-01-05 ENCOUNTER — PROCEDURE VISIT (OUTPATIENT)
Dept: OBSTETRICS AND GYNECOLOGY | Age: 51
End: 2021-01-05

## 2021-01-05 DIAGNOSIS — Z12.31 VISIT FOR SCREENING MAMMOGRAM: Primary | ICD-10-CM

## 2021-01-05 DIAGNOSIS — I83.813 VARICOSE VEINS OF BOTH LOWER EXTREMITIES WITH PAIN: ICD-10-CM

## 2021-01-05 DIAGNOSIS — Z01.419 ENCOUNTER FOR GYNECOLOGICAL EXAMINATION (GENERAL) (ROUTINE) WITHOUT ABNORMAL FINDINGS: Primary | ICD-10-CM

## 2021-01-05 PROCEDURE — 99396 PREV VISIT EST AGE 40-64: CPT | Performed by: OBSTETRICS & GYNECOLOGY

## 2021-01-05 PROCEDURE — 77067 SCR MAMMO BI INCL CAD: CPT | Performed by: OBSTETRICS & GYNECOLOGY

## 2021-01-05 PROCEDURE — 77067 SCR MAMMO BI INCL CAD: CPT | Performed by: RADIOLOGY

## 2021-01-05 NOTE — PROGRESS NOTES
Routine Annual Visit    2021    Patient: Makenzie Justin          MR#:[id number]      Chief Complaint   Patient presents with   • Gynecologic Exam     AE today. MG is due (will ask Candida about w/i).  Diagnostic MG 10/23/2019.  Pap  = neg/neg.  Iud Liletta 2017.  Teaching via NTI and in person.  Three children. Two boys and a girl. .        History of Present Illness    50 y.o. female  who presents for annual exam.   Pt feeling well, no menses with IUD  No HF or NS  Shin and Damián at [employer] Shin is 18 and driving   , private  Teaching at [employer], some NTI, but mostly in person  mammo today  Pap is UTD    Varicosities of both legs, achiness at times, has done some treatment in past          No LMP recorded. (Menstrual status: Other).  Obstetric History:  OB History        6    Para   1    Term   1            AB   2    Living   4       SAB   1    TAB        Ectopic        Molar        Multiple        Live Births   4               Menstrual History:     No LMP recorded. (Menstrual status: Other).       Sexual History:       ________________________________________  Patient Active Problem List   Diagnosis   • Dysthymia   • Hyperglycemia   • Gastroesophageal reflux disease without esophagitis   • Ophthalmic migraine       Past Medical History:   Diagnosis Date   • Allergic rhinitis    • Anxiety    • Depression    • GERD (gastroesophageal reflux disease)    • History of miscarriage    • IUD (intrauterine device) in place 2017    Lilletta   • Menorrhagia    • PMS (premenstrual syndrome)    • Stress at home    • Stress at work    • Vaginal delivery        Past Surgical History:   Procedure Laterality Date   • DILATATION AND CURETTAGE         Social History     Tobacco Use   Smoking Status Never Smoker   Smokeless Tobacco Never Used       has a current medication list which includes the following prescription(s): desvenlafaxine succinate er, hydroxyzine pamoate,  "levocetirizine, montelukast, pantoprazole, stahist ad, and zolpidem, and the following Facility-Administered Medications: levonorgestrel.  ________________________________________    Current contraception: IUD  History of abnormal Pap smear: no  Family history of Breast cancer: no  Family history of uterine or ovarian cancer: no  Family History of colon cancer/colon polyps: no  History of abnormal mammogram: no      The following portions of the patient's history were reviewed and updated as appropriate: allergies, current medications, past family history, past medical history, past social history, past surgical history and problem list.    Review of Systems    Pertinent items are noted in HPI.     Objective   Physical Exam    Breastfeeding No    BP Readings from Last 3 Encounters:   12/15/20 124/78   12/04/20 132/86   11/16/20 133/82      Wt Readings from Last 3 Encounters:   12/15/20 94.3 kg (208 lb)   12/04/20 93.9 kg (207 lb)   11/16/20 94.2 kg (207 lb 9.6 oz)      BMI: Estimated body mass index is 33.59 kg/m² as calculated from the following:    Height as of 12/15/20: 167.6 cm (65.98\").    Weight as of 12/15/20: 94.3 kg (208 lb).      General:   alert, appears stated age and cooperative   Abdomen: soft, non-tender, without masses or organomegaly   Breast: inspection negative, no nipple discharge or bleeding, no masses or nodularity palpable   Vulva: normal   Vagina: normal mucosa   Cervix: no cervical motion tenderness and no lesions   String present   Uterus: normal size, mobile or non-tender   Adnexa: no mass, fullness, tenderness     Assessment:    1. Normal annual exam   Assessment     ICD-10-CM ICD-9-CM   1. Encounter for gynecological examination (general) (routine) without abnormal findings  Z01.419 V72.31   2. Varicose veins of both lower extremities with pain  I83.813 454.8     Plan:    Plan     [x]  Mammogram request made  [x]  PAP done  []  Labs:   []  GC/Chl/TV  []  DEXA scan   []  Referral for " colonoscopy:       Diagnoses and all orders for this visit:    1. Encounter for gynecological examination (general) (routine) without abnormal findings (Primary)    2. Varicose veins of both lower extremities with pain  -     Ambulatory Referral to Vascular Surgery            Counseling:  --Nutrition: Stressed importance of moderation and caloric balance, stressed fresh fruit and vegetables  --Exercise: Stressed the importance of regular exercise. 3-5 times weekly   - Discussed screening mammogram recommendations.   --Discussed benefits of screening colonoscopy- age 45 unless FH  --Discussed pap smear screening recommendations

## 2021-01-27 ENCOUNTER — OFFICE VISIT (OUTPATIENT)
Dept: SLEEP MEDICINE | Facility: HOSPITAL | Age: 51
End: 2021-01-27

## 2021-01-27 VITALS
HEART RATE: 85 BPM | OXYGEN SATURATION: 98 % | SYSTOLIC BLOOD PRESSURE: 170 MMHG | BODY MASS INDEX: 34.65 KG/M2 | HEIGHT: 66 IN | WEIGHT: 215.6 LBS | DIASTOLIC BLOOD PRESSURE: 82 MMHG

## 2021-01-27 DIAGNOSIS — I10 ESSENTIAL HYPERTENSION: ICD-10-CM

## 2021-01-27 DIAGNOSIS — F51.04 PSYCHOPHYSIOLOGICAL INSOMNIA: ICD-10-CM

## 2021-01-27 DIAGNOSIS — R29.818 SUSPECTED SLEEP APNEA: Primary | ICD-10-CM

## 2021-01-27 DIAGNOSIS — E66.9 OBESITY (BMI 30-39.9): ICD-10-CM

## 2021-01-27 PROCEDURE — G0463 HOSPITAL OUTPT CLINIC VISIT: HCPCS

## 2021-01-27 NOTE — PROGRESS NOTES
[facility]  Telephone: [phone] / Fax: [phone] [place]  Telephone: [phone] / Fax: [phone] [place]    Referring Physician: Robert Mendoza MD  PCP: Robert Mendoza MD    Reason for consult:  sleep apnea    Makenzie Justin is a 50 y.o.female  was seen in the [facility] today for evaluation of sleep apnea. She is a good sleeper once she is asleep. She reports occasional episodes of GERD that wakes her up from sleep and is pending endoscopy/colonosopy. She is unaware of snoring. She wakes up choking from sleep due to reflux and has constant post nasal drip. She had required Ambien in the past for situational insomnia and it helped. She has mainly sleep onset insomnia, not maintenance insomnia. She grinds her teeth at night and feels that she has stress related issues. Her sleep schedule is 12:30pm-6:30am.    SH- teacher, no alcohol, drinks 3 sodas per day.    ROS- +post nasal drip, +GERD, rest is negative.    Makenzie Justin  has a past medical history of Allergic rhinitis, Anxiety, Depression, GERD (gastroesophageal reflux disease), History of miscarriage, IUD (intrauterine device) in place (05/08/2017), Menorrhagia, PMS (premenstrual syndrome), Stress at home, Stress at work, and Vaginal delivery.    Current Medications:    Current Outpatient Medications:   •  Desvenlafaxine Succinate ER 25 MG tablet sustained-release 24 hour, TAKE 1 TABLET BY MOUTH DAILY, Disp: 90 tablet, Rfl: 3  •  hydrOXYzine pamoate (VISTARIL) 25 MG capsule, 1 HS, Disp: 30 capsule, Rfl: 11  •  levocetirizine (XYZAL) 5 MG tablet, , Disp: , Rfl:   •  montelukast (SINGULAIR) 10 MG tablet, Take  by mouth Daily., Disp: , Rfl: 5  •  pantoprazole (PROTONIX) 40 MG EC tablet, TAKE 1 TABLET BY MOUTH DAILY, Disp: 90 tablet, Rfl: 3  •  STAHIST AD 25-60 MG tablet, TK 1 T PO Q 8 H PRF COG, Disp: , Rfl: 3  •  zolpidem (AMBIEN) 5 MG tablet, 1 hs prn sleep. No more than 5 consecutive  "nights, Disp: 30 tablet, Rfl: 0    Current Facility-Administered Medications:   •  Levonorgestrel (LILETTA) 18.6 MCG/DAY IUD, , Intrauterine, Once, Daria Castellanos MD    I have reviewed Past Medical History, Past Surgical History, Medication List, Social History and Family History as entered in Sleep Questionnaire and EPIC.    ESS     Vital Signs /82 (BP Location: Left arm, Patient Position: Sitting)   Pulse 85   Ht 167.6 cm (66\")   Wt 97.8 kg (215 lb 9.6 oz)   SpO2 98%   BMI 34.80 kg/m²  Body mass index is 34.8 kg/m².    General Alert and oriented. No acute distress noted   Pharynx/Throat Class IV Mallampati airway, large tongue, no evidence of redundant lateral pharyngeal tissue. No oral lesions. No thrush. Moist mucous membranes.   Head Normocephalic. Symmetrical. Atraumatic.    Nose No septal deviation. No drainage   Chest Wall Normal shape. Symmetric expansion with respiration. No tenderness.   Neck Trachea midline, no thyromegaly or adenopathy    Lungs Clear to auscultation bilaterally. No wheezes. No rhonchi. No rales. Respirations regular, even and unlabored.   Heart Regular rhythm and normal rate. Normal S1 and S2. No murmur   Abdomen Soft, non-tender and non-distended. Normal bowel sounds. No masses.   Extremities Moves all extremities well. No edema   Psychiatric Normal mood and affect.        Impression:  1. Suspected sleep apnea    2. Psychophysiological insomnia    3. Obesity (BMI 30-39.9)    4. Essential hypertension          Plan:  I discussed the pathophysiology of obstructive sleep apnea with the patient.  We discussed the adverse outcomes associated with untreated sleep-disordered breathing.  We discussed treatment modalities of obstructive sleep apnea including CPAP device. Sleep study will be scheduled to establish a definitive diagnosis of sleep disorder breathing.  Weight loss will be strongly beneficial in order to reduce the severity of sleep-disordered breathing.  Patient has " narrow oropharyngeal structure.  Caution during activities that require prolonged concentration is strongly advised.  After sleep study results are available, patient will be notified, and appointment will be scheduled to discuss sleep study results and treatment recommendations.    BP is elevated today. Pt feels could be stress vs pain. Advised to recheck BP at home and address through PCP if persistently elevated.        I appreciate the opportunity to participate in this patient's care.      YELITZA Sam  [facility]  Phone: [phone]      Part of this note may be an electronic transcription/translation of spoken language to printed text using the Dragon Dictation System. Some errors may exist even though the document was edited.

## 2021-02-03 ENCOUNTER — PREP FOR SURGERY (OUTPATIENT)
Dept: SURGERY | Facility: SURGERY CENTER | Age: 51
End: 2021-02-03

## 2021-02-03 ENCOUNTER — OFFICE VISIT (OUTPATIENT)
Dept: GASTROENTEROLOGY | Facility: CLINIC | Age: 51
End: 2021-02-03

## 2021-02-03 VITALS
HEART RATE: 74 BPM | HEIGHT: 66 IN | OXYGEN SATURATION: 94 % | TEMPERATURE: 96.6 F | WEIGHT: 213.3 LBS | DIASTOLIC BLOOD PRESSURE: 90 MMHG | BODY MASS INDEX: 34.28 KG/M2 | SYSTOLIC BLOOD PRESSURE: 140 MMHG

## 2021-02-03 DIAGNOSIS — K21.9 GASTROESOPHAGEAL REFLUX DISEASE WITHOUT ESOPHAGITIS: ICD-10-CM

## 2021-02-03 DIAGNOSIS — K21.9 GASTROESOPHAGEAL REFLUX DISEASE, UNSPECIFIED WHETHER ESOPHAGITIS PRESENT: Primary | ICD-10-CM

## 2021-02-03 DIAGNOSIS — Z12.11 ENCOUNTER FOR SCREENING FOR MALIGNANT NEOPLASM OF COLON: ICD-10-CM

## 2021-02-03 DIAGNOSIS — Z12.11 ENCOUNTER FOR SCREENING FOR MALIGNANT NEOPLASM OF COLON: Primary | ICD-10-CM

## 2021-02-03 PROCEDURE — 99244 OFF/OP CNSLTJ NEW/EST MOD 40: CPT | Performed by: INTERNAL MEDICINE

## 2021-02-03 RX ORDER — SODIUM CHLORIDE, SODIUM LACTATE, POTASSIUM CHLORIDE, CALCIUM CHLORIDE 600; 310; 30; 20 MG/100ML; MG/100ML; MG/100ML; MG/100ML
30 INJECTION, SOLUTION INTRAVENOUS CONTINUOUS PRN
Status: CANCELLED | OUTPATIENT
Start: 2021-02-03

## 2021-02-03 RX ORDER — OLOPATADINE HYDROCHLORIDE 665 UG/1
2 SPRAY NASAL 2 TIMES DAILY
COMMUNITY
Start: 2021-01-15

## 2021-02-03 RX ORDER — SODIUM CHLORIDE 0.9 % (FLUSH) 0.9 %
3 SYRINGE (ML) INJECTION EVERY 12 HOURS SCHEDULED
Status: CANCELLED | OUTPATIENT
Start: 2021-02-03

## 2021-02-03 RX ORDER — SODIUM CHLORIDE 0.9 % (FLUSH) 0.9 %
10 SYRINGE (ML) INJECTION AS NEEDED
Status: CANCELLED | OUTPATIENT
Start: 2021-02-03

## 2021-02-03 NOTE — PROGRESS NOTES
"Chief complaint GERD and screening         History of Present Illness  Delightful 50-year-old female who we are asked to see for evaluation of GERD.  She was seeing her primary care physician and she has been having worsening heartburn and reflux.  She states that she has had heartburn and reflux for her \"whole life\".  She was recently placed on pantoprazole 40 mg daily and reports that this has given her a significant improvement in her symptoms.  She describes burning up into the esophagus.  She has significant allergies is on multiple allergy medications and reports that there is some concern about whether the reflux is causing her allergy symptoms or the allergy symptoms may be worsening her reflux.  She does not have any odynophagia and no dysphagia no hematemesis no coffee-ground emesis no family history of colon or esophageal cancer and no tobacco use.    Patient is 50 has not had a screening colonoscopy    Patient had cholestasis of pregnancy 10 years ago  Review of Systems  significant seasonal allergies   Result Review :          Vital Signs:   /90   Pulse 74   Temp 96.6 °F (35.9 °C)   Ht 167.6 cm (66\")   Wt 96.8 kg (213 lb 4.8 oz)   SpO2 94%   BMI 34.43 kg/m²     Body mass index is 34.43 kg/m².     Physical Exam  Vitals signs reviewed.   Constitutional:       Appearance: Normal appearance.   HENT:      Nose: No nasal deformity.   Eyes:      General: No scleral icterus.  Neck:      Comments: Trachea midline.  Cardiovascular:      Rate and Rhythm: Normal rate and regular rhythm.   Pulmonary:      Effort: No respiratory distress.      Breath sounds: Normal breath sounds.   Abdominal:      General: Bowel sounds are normal. There is no distension.      Palpations: Abdomen is soft. There is no mass.      Tenderness: There is no abdominal tenderness.   Lymphadenopathy:      Comments: No periumbilical lymphadenopathy.   Skin:     General: Skin is warm.   Neurological:      Mental Status: She is alert. "             Assessment and Plan      Diagnoses and all orders for this visit:    1. Gastroesophageal reflux disease, unspecified whether esophagitis present (Primary)    2. Encounter for screening for malignant neoplasm of colon         1.  Schedule EGD for evaluation of heartburn and reflux rule out Uw's esophagus evaluate for EOE and rule out Candida  2.  Continue PPI for the time being.  Based on the endoscopic findings can determine frequency dose and duration recommendations  3.  Schedule screening colonoscopy             Follow Up   Return for after planned testing, based on clinical course, at procedures.     There are no Patient Instructions on file for this visit.

## 2021-02-17 ENCOUNTER — TRANSCRIBE ORDERS (OUTPATIENT)
Dept: LAB | Facility: SURGERY CENTER | Age: 51
End: 2021-02-17

## 2021-02-17 DIAGNOSIS — Z01.818 OTHER SPECIFIED PRE-OPERATIVE EXAMINATION: Primary | ICD-10-CM

## 2021-02-23 ENCOUNTER — HOSPITAL ENCOUNTER (OUTPATIENT)
Dept: SLEEP MEDICINE | Facility: HOSPITAL | Age: 51
Discharge: HOME | End: 2021-02-23
Admitting: NURSE PRACTITIONER

## 2021-02-23 DIAGNOSIS — R29.818 SUSPECTED SLEEP APNEA: ICD-10-CM

## 2021-02-23 DIAGNOSIS — F51.04 PSYCHOPHYSIOLOGICAL INSOMNIA: ICD-10-CM

## 2021-02-23 PROCEDURE — 95806 SLEEP STUDY UNATT&RESP EFFT: CPT

## 2021-04-06 ENCOUNTER — BULK ORDERING (OUTPATIENT)
Dept: CASE MANAGEMENT | Facility: OTHER | Age: 51
End: 2021-04-06

## 2021-04-06 DIAGNOSIS — Z23 IMMUNIZATION DUE: ICD-10-CM

## 2021-04-16 ENCOUNTER — LAB (OUTPATIENT)
Dept: LAB | Facility: SURGERY CENTER | Age: 51
End: 2021-04-16

## 2021-04-16 DIAGNOSIS — Z01.818 OTHER SPECIFIED PRE-OPERATIVE EXAMINATION: ICD-10-CM

## 2021-04-16 LAB — SARS-COV-2 ORF1AB RESP QL NAA+PROBE: NOT DETECTED

## 2021-04-16 PROCEDURE — U0004 COV-19 TEST NON-CDC HGH THRU: HCPCS | Performed by: SURGERY

## 2021-04-16 PROCEDURE — C9803 HOPD COVID-19 SPEC COLLECT: HCPCS

## 2021-04-19 ENCOUNTER — ANESTHESIA (OUTPATIENT)
Dept: SURGERY | Facility: SURGERY CENTER | Age: 51
End: 2021-04-19

## 2021-04-19 ENCOUNTER — ANESTHESIA EVENT (OUTPATIENT)
Dept: SURGERY | Facility: SURGERY CENTER | Age: 51
End: 2021-04-19

## 2021-04-19 ENCOUNTER — HOSPITAL ENCOUNTER (OUTPATIENT)
Facility: SURGERY CENTER | Age: 51
Setting detail: HOSPITAL OUTPATIENT SURGERY
Discharge: HOME | End: 2021-04-19
Attending: INTERNAL MEDICINE | Admitting: INTERNAL MEDICINE

## 2021-04-19 VITALS
TEMPERATURE: 97.7 F | RESPIRATION RATE: 16 BRPM | HEIGHT: 66 IN | DIASTOLIC BLOOD PRESSURE: 61 MMHG | WEIGHT: 206 LBS | OXYGEN SATURATION: 98 % | SYSTOLIC BLOOD PRESSURE: 104 MMHG | HEART RATE: 67 BPM | BODY MASS INDEX: 33.11 KG/M2

## 2021-04-19 DIAGNOSIS — Z12.11 ENCOUNTER FOR SCREENING FOR MALIGNANT NEOPLASM OF COLON: ICD-10-CM

## 2021-04-19 DIAGNOSIS — F34.1 DYSTHYMIA: ICD-10-CM

## 2021-04-19 DIAGNOSIS — K21.9 GASTROESOPHAGEAL REFLUX DISEASE WITHOUT ESOPHAGITIS: ICD-10-CM

## 2021-04-19 PROCEDURE — 0DBN8ZX EXCISION OF SIGMOID COLON, VIA NATURAL OR ARTIFICIAL OPENING ENDOSCOPIC, DIAGNOSTIC: ICD-10-PCS | Performed by: INTERNAL MEDICINE

## 2021-04-19 PROCEDURE — 0DB58ZX EXCISION OF ESOPHAGUS, VIA NATURAL OR ARTIFICIAL OPENING ENDOSCOPIC, DIAGNOSTIC: ICD-10-PCS | Performed by: INTERNAL MEDICINE

## 2021-04-19 PROCEDURE — 81025 URINE PREGNANCY TEST: CPT | Performed by: INTERNAL MEDICINE

## 2021-04-19 PROCEDURE — 88305 TISSUE EXAM BY PATHOLOGIST: CPT | Performed by: INTERNAL MEDICINE

## 2021-04-19 PROCEDURE — 43239 EGD BIOPSY SINGLE/MULTIPLE: CPT | Performed by: INTERNAL MEDICINE

## 2021-04-19 PROCEDURE — 25010000002 PROPOFOL 10 MG/ML EMULSION: Performed by: ANESTHESIOLOGY

## 2021-04-19 PROCEDURE — 45380 COLONOSCOPY AND BIOPSY: CPT | Performed by: INTERNAL MEDICINE

## 2021-04-19 PROCEDURE — 0DB68ZX EXCISION OF STOMACH, VIA NATURAL OR ARTIFICIAL OPENING ENDOSCOPIC, DIAGNOSTIC: ICD-10-PCS | Performed by: INTERNAL MEDICINE

## 2021-04-19 RX ORDER — LIDOCAINE HYDROCHLORIDE 20 MG/ML
INJECTION, SOLUTION INFILTRATION; PERINEURAL AS NEEDED
Status: DISCONTINUED | OUTPATIENT
Start: 2021-04-19 | End: 2021-04-19 | Stop reason: SURG

## 2021-04-19 RX ORDER — LIDOCAINE HYDROCHLORIDE 10 MG/ML
0.5 INJECTION, SOLUTION INFILTRATION; PERINEURAL ONCE AS NEEDED
Status: DISCONTINUED | OUTPATIENT
Start: 2021-04-19 | End: 2021-04-19 | Stop reason: HOSPADM

## 2021-04-19 RX ORDER — SODIUM CHLORIDE 0.9 % (FLUSH) 0.9 %
3 SYRINGE (ML) INJECTION EVERY 12 HOURS SCHEDULED
Status: DISCONTINUED | OUTPATIENT
Start: 2021-04-19 | End: 2021-04-19 | Stop reason: HOSPADM

## 2021-04-19 RX ORDER — SODIUM CHLORIDE, SODIUM LACTATE, POTASSIUM CHLORIDE, CALCIUM CHLORIDE 600; 310; 30; 20 MG/100ML; MG/100ML; MG/100ML; MG/100ML
30 INJECTION, SOLUTION INTRAVENOUS CONTINUOUS PRN
Status: DISCONTINUED | OUTPATIENT
Start: 2021-04-19 | End: 2021-04-19 | Stop reason: HOSPADM

## 2021-04-19 RX ORDER — DESVENLAFAXINE 25 MG/1
TABLET, EXTENDED RELEASE ORAL
Qty: 90 TABLET | Refills: 3 | Status: SHIPPED | OUTPATIENT
Start: 2021-04-19 | End: 2022-04-19

## 2021-04-19 RX ORDER — PROPOFOL 10 MG/ML
VIAL (ML) INTRAVENOUS AS NEEDED
Status: DISCONTINUED | OUTPATIENT
Start: 2021-04-19 | End: 2021-04-19 | Stop reason: SURG

## 2021-04-19 RX ORDER — SODIUM CHLORIDE, SODIUM LACTATE, POTASSIUM CHLORIDE, CALCIUM CHLORIDE 600; 310; 30; 20 MG/100ML; MG/100ML; MG/100ML; MG/100ML
1000 INJECTION, SOLUTION INTRAVENOUS CONTINUOUS
Status: DISCONTINUED | OUTPATIENT
Start: 2021-04-19 | End: 2021-04-19 | Stop reason: HOSPADM

## 2021-04-19 RX ORDER — MAGNESIUM HYDROXIDE 1200 MG/15ML
LIQUID ORAL AS NEEDED
Status: DISCONTINUED | OUTPATIENT
Start: 2021-04-19 | End: 2021-04-19 | Stop reason: HOSPADM

## 2021-04-19 RX ORDER — SODIUM CHLORIDE 0.9 % (FLUSH) 0.9 %
10 SYRINGE (ML) INJECTION AS NEEDED
Status: DISCONTINUED | OUTPATIENT
Start: 2021-04-19 | End: 2021-04-19 | Stop reason: HOSPADM

## 2021-04-19 RX ADMIN — LIDOCAINE HYDROCHLORIDE 60 MG: 20 INJECTION, SOLUTION INFILTRATION; PERINEURAL at 10:34

## 2021-04-19 RX ADMIN — SODIUM CHLORIDE, POTASSIUM CHLORIDE, SODIUM LACTATE AND CALCIUM CHLORIDE 1000 ML: 600; 310; 30; 20 INJECTION, SOLUTION INTRAVENOUS at 10:17

## 2021-04-19 RX ADMIN — PROPOFOL 160 MCG/KG/MIN: 10 INJECTION, EMULSION INTRAVENOUS at 10:35

## 2021-04-19 RX ADMIN — PROPOFOL 120 MG: 10 INJECTION, EMULSION INTRAVENOUS at 10:35

## 2021-04-19 NOTE — DISCHARGE INSTRUCTIONS
ENDOSCOPY - EGD/COLONOSCOPY       ADULT CARE DISCHARGE  INSTRUCTIONS     Symptoms you may temporarily experience:      • Sore Throat     • Hoarseness     • Bloating/Cramping     • Dizziness     • IV Irritation/tenderness     • Gas or Belching     • Slight fever     • Small amount of blood in vomit or stool       Call Your Doctor for the following Problems: ______________________     ________________     • Fever of 101 degrees or higher       • Sharp abdominal  pain     • Red streak up the arm from the IV site     • Severe cramping        • Large amount of blood in stool or vomit       Instructions for the next 24 hours after your Procedure:     • Adult supervision     • Do NOT drink any alcohol      • Do not work today     • NO important decisions     • DO NOT sign any legal documents     • You may shower/ bathe       • DO NOT  DRIVE or operate machinery     • Resume normal activity tomorrow       Discharge  Diet:     • Avoid spicy/ greasy foods     • Avoid any food that will cause more gas or bloating       *** Seek IMMEDIATE medical attention and call 911 if you develop symptoms such as:     • Chest pain     • Shortness of breath     Severe bleeding Education provided the Patient on the following:    - Nothing to Eat or Drink after MN the night before the procedure  -Your required COVID Test is Scheduled on          Between the Hours of 0012-7769  -You will only be notified if your COVID test Result is POSITIVE  -The importance of reducing your number of contacts by self quarantining after you COVID test until the date of your Colonoscopy and EGD    - Avoid red/purple fluids while completing their bowel prep as ordered by physician  -Contact Gastrointerologist office for any questions about specific details regarding colon prep    -You will need to have someone drive you home after your colonoscopy and remain with you for 24 hours after the procedure  - The date of your procedure, your ride is welcome to either  remain in our parking lot or within 10-15 minutes of [facility]  -Please wear warm socks when you arrive for your procedure  -Remove all jewelry and leave any valuables before arriving the day of your procedure (all will have to be removed before leaving preop)  -You will need to arrive at   0930        on   4/19        for your procedure    -Feel free to contact us at: [phone] with any additional questions/concerns         Has the patient ever tested Positive COVID?   NO  If so when?

## 2021-04-19 NOTE — H&P
No chief complaint on file.      HPI  gerd  screening         Problem List:    Patient Active Problem List   Diagnosis   • Dysthymia   • Hyperglycemia   • Gastroesophageal reflux disease without esophagitis   • Ophthalmic migraine   • Encounter for screening for malignant neoplasm of colon       Medical History:    Past Medical History:   Diagnosis Date   • Allergic rhinitis    • Anxiety    • Depression    • GERD (gastroesophageal reflux disease)    • History of miscarriage    • IUD (intrauterine device) in place 05/08/2017    [place]   • Menorrhagia    • PMS (premenstrual syndrome)    • Stress at home    • Stress at work    • Vaginal delivery         Social History:    Social History     Socioeconomic History   • Marital status:      Spouse name: Not on file   • Number of children: Not on file   • Years of education: Not on file   • Highest education level: Not on file   Tobacco Use   • Smoking status: Never Smoker   • Smokeless tobacco: Never Used   Vaping Use   • Vaping Use: Never used   Substance and Sexual Activity   • Alcohol use: No   • Drug use: Defer   • Sexual activity: Yes     Partners: Male     Birth control/protection: I.U.D.     Comment: SPOUSE = ADELA       Family History:   Family History   Problem Relation Age of Onset   • Hyperlipidemia Mother    • Hypertension Mother    • Heart disease Mother    • Diabetes Father    • Cancer Father 71        lung   • Osteopenia Other    • Thyroid disease Other    • Diabetes Other    • Hypertension Other    • Lung cancer Other    • No Known Problems Sister    • No Known Problems Brother    • No Known Problems Daughter    • No Known Problems Son    • No Known Problems Maternal Grandmother    • No Known Problems Paternal Grandmother    • No Known Problems Maternal Aunt    • No Known Problems Paternal Aunt    • BRCA 1/2 Neg Hx    • Breast cancer Neg Hx    • Colon cancer Neg Hx    • Endometrial cancer Neg Hx    • Ovarian cancer Neg Hx    • Colon polyps Neg Hx     • Irritable bowel syndrome Neg Hx    • Ulcerative colitis Neg Hx        Surgical History:   Past Surgical History:   Procedure Laterality Date   • DILATATION AND CURETTAGE           Current Facility-Administered Medications:   •  Levonorgestrel (LILETTA) 18.6 MCG/DAY IUD, , Intrauterine, Once, Daria Castellanos MD    Current Outpatient Medications:   •  Desvenlafaxine Succinate ER 25 MG tablet sustained-release 24 hour, TAKE 1 TABLET BY MOUTH DAILY, Disp: 90 tablet, Rfl: 3  •  hydrOXYzine pamoate (VISTARIL) 25 MG capsule, 1 HS, Disp: 30 capsule, Rfl: 11  •  levocetirizine (XYZAL) 5 MG tablet, , Disp: , Rfl:   •  montelukast (SINGULAIR) 10 MG tablet, Take  by mouth Daily., Disp: , Rfl: 5  •  olopatadine (PATANASE) 0.6 % solution nasal solution, 2 sprays by Each Nare route 2 (Two) Times a Day., Disp: , Rfl:   •  pantoprazole (PROTONIX) 40 MG EC tablet, TAKE 1 TABLET BY MOUTH DAILY, Disp: 90 tablet, Rfl: 3  •  STAHIST AD 25-60 MG tablet, TK 1 T PO Q 8 H PRF COG, Disp: , Rfl: 3  •  zolpidem (AMBIEN) 5 MG tablet, 1 hs prn sleep. No more than 5 consecutive nights, Disp: 30 tablet, Rfl: 0    Allergies: No Known Allergies     The following portions of the patient's history were reviewed by me and updated as appropriate: review of systems, allergies, current medications, past family history, past medical history, past social history, past surgical history and problem list.    There were no vitals filed for this visit.    PHYSICAL EXAM:    CONSTITUTIONAL:  today's vital signs reviewed by me  GASTROINTESTINAL: abdomen is soft nontender nondistended with normal active bowel sounds, no masses are appreciated    Assessment/ Plan  gerd  Screening    egd and colonosocpy    Risks and benefits as well as alternatives to endoscopic evaluation were explained to the patient and they voiced understanding and wish to proceed.  These risks include but are not limited to the risk of bleeding, perforation, adverse reaction to sedation, and  missed lesions.  The patient was given the opportunity to ask questions prior to the endoscopic procedure.

## 2021-04-19 NOTE — ANESTHESIA POSTPROCEDURE EVALUATION
"Patient: Makenzie Justin    Procedure Summary     Date: 04/19/21 Room / Location: [facility]    Anesthesia Start: 1034 Anesthesia Stop: 1109    Procedures:       ESOPHAGOGASTRODUODENOSCOPY (N/A )      COLONOSCOPY (N/A ) Diagnosis:       Gastroesophageal reflux disease without esophagitis      Encounter for screening for malignant neoplasm of colon      (Gastroesophageal reflux disease without esophagitis [K21.9])      (Encounter for screening for malignant neoplasm of colon [Z12.11])    Surgeons: Hossein Ribera MD Provider: Isaac Taylor MD    Anesthesia Type: MAC ASA Status: 2          Anesthesia Type: MAC    Vitals  Vitals Value Taken Time   /62 04/19/21 1115   Temp 36.5 °C (97.7 °F) 04/19/21 1103   Pulse 77 04/19/21 1115   Resp 16 04/19/21 1115   SpO2 97 % 04/19/21 1115           Post Anesthesia Care and Evaluation    Patient location during evaluation: bedside  Patient participation: complete - patient participated  Level of consciousness: awake and alert  Pain management: adequate  Anesthetic complications: No anesthetic complications    Cardiovascular status: acceptable  Respiratory status: acceptable  Hydration status: acceptable    Comments: /62 (BP Location: Right arm, Patient Position: Lying)   Pulse 77   Temp 36.5 °C (97.7 °F) (Infrared)   Resp 16   Ht 167.6 cm (66\")   Wt 93.4 kg (206 lb)   SpO2 97%   BMI 33.25 kg/m²         "

## 2021-04-19 NOTE — ANESTHESIA PREPROCEDURE EVALUATION
Anesthesia Evaluation     history of anesthetic complications:  NPO Solid Status: > 8 hours  NPO Liquid Status: > 2 hours           Airway   Mallampati: II  Dental      Pulmonary - normal exam   Cardiovascular - normal exam        Neuro/Psych  (+) headaches, psychiatric history Anxiety and Depression,     GI/Hepatic/Renal/Endo    (+)  GERD,      Musculoskeletal     Abdominal    Substance History      OB/GYN          Other                      Anesthesia Plan    ASA 2     MAC   total IV anesthesia    Anesthetic plan, all risks, benefits, and alternatives have been provided, discussed and informed consent has been obtained with: patient.

## 2021-04-20 ENCOUNTER — TELEPHONE (OUTPATIENT)
Dept: GASTROENTEROLOGY | Facility: CLINIC | Age: 51
End: 2021-04-20

## 2021-04-20 LAB
CYTO UR: NORMAL
LAB AP CASE REPORT: NORMAL
LAB AP CLINICAL INFORMATION: NORMAL
PATH REPORT.FINAL DX SPEC: NORMAL
PATH REPORT.GROSS SPEC: NORMAL

## 2021-04-20 NOTE — TELEPHONE ENCOUNTER
Dr MUHAMMAD,  She had an EGD & CLS on 04/19.  She is taking Prilosec otc Q am and Pantoprazole 40mg Q HS.  She continues to have heartburn.  The EGD shows mild inflammation of the stomach and a hiatal hernia.   She mentioned you may want to change her meds.   Please advise.    Discussed with patient and sent in new rx for Carafate. pk

## 2021-04-21 DIAGNOSIS — K21.9 GASTROESOPHAGEAL REFLUX DISEASE WITHOUT ESOPHAGITIS: Primary | ICD-10-CM

## 2021-04-21 DIAGNOSIS — K21.9 GASTROESOPHAGEAL REFLUX DISEASE WITHOUT ESOPHAGITIS: ICD-10-CM

## 2021-04-21 RX ORDER — SUCRALFATE 1 G/1
1 TABLET ORAL 2 TIMES DAILY
Qty: 60 TABLET | Refills: 1 | Status: SHIPPED | OUTPATIENT
Start: 2021-04-21 | End: 2021-05-25

## 2021-04-21 RX ORDER — SUCRALFATE 1 G/1
TABLET ORAL
Qty: 180 TABLET | OUTPATIENT
Start: 2021-04-21

## 2021-05-25 ENCOUNTER — OFFICE VISIT (OUTPATIENT)
Dept: GASTROENTEROLOGY | Facility: CLINIC | Age: 51
End: 2021-05-25

## 2021-05-25 DIAGNOSIS — K21.9 GASTROESOPHAGEAL REFLUX DISEASE WITHOUT ESOPHAGITIS: ICD-10-CM

## 2021-05-25 PROCEDURE — 99443 PR PHYS/QHP TELEPHONE EVALUATION 21-30 MIN: CPT | Performed by: NURSE PRACTITIONER

## 2021-05-25 NOTE — PROGRESS NOTES
Chief Complaint   Patient presents with   • Follow-up     Reviewed recent EGD and colonoscopy, discussed acid reflux         History of Present Illness  51-year-old female presents today for follow-up.  Initial consult February 3, 2021 with Dr. Ribera for acid reflux.  EGD and screening colonoscopy were performed April 19, 2021.    She was prescribed sucralfate but was unable to tolerate the medication given the size of the pill even if the medication was dissolved or split in half.    She is currently taking Prilosec over-the-counter in the morning and pantoprazole 40 mg in the evening.  She continues to experience for episodes a month of significant reflux nocturnally and when in the supine position despite sleeping with head of the bed elevated.    She has not noticed pattern or specific food triggers related to episodes of worsening reflux in the evening or while sleeping.  She has noticed however that symptoms are more pronounced if she eats later or with any consumption of alcohol.    She has made diet and lifestyle changes and avoids alcohol as she knows that this will automatically cause worsening reflux episode in the evening.  She is also working on weight loss efforts and trying to avoid known reflux food triggers.    Since starting pantoprazole 1 month ago she has maybe noticed less severe or less frequent episodes of total regurgitation or water brash but still continues to have 4 episodes a month of nocturnal reflux.    You have chosen to receive care through a telephone visit.   Do you consent to use a telephone visit for your medical care today? Yes    Review of Systems   Allergic/Immunologic: Positive for environmental allergies.      Result Review :      COLONOSCOPY (04/19/2021 10:29)   UPPER GI ENDOSCOPY (04/19/2021 10:29)     Physical Exam - TELEPHONE VISIT    Assessment and Plan    Diagnoses and all orders for this visit:    1. Gastroesophageal reflux disease without esophagitis  -      pantoprazole (PROTONIX) 40 MG EC tablet; Take 1 tablet by mouth 2 (two) times a day.  Dispense: 180 tablet; Refill: 1       Reviewed recent EGD and colonoscopy.  EGD with irregular Z-line, 2 cm hiatal hernia, mild gastritis otherwise normal.  Colonoscopy with 4 mm polyp in the rectosigmoid colon (tubular adenoma).    Recommend colonoscopy in 5 years for colon cancer screening, this has been placed in our electronic reminder system.    Despite daily pantoprazole and Prilosec which she has been on for over 10 years, she continues to experience significant reflux episodes that are worse later in the day or at bedtime.  We will stop Prilosec over-the-counter in the morning and add pantoprazole 40 mg twice daily until follow-up visit in 6 weeks.  Also strongly encouraged her to continue dietary and lifestyle modifications with smaller more frequent meals, avoiding heavy foods or large meal prior to bedtime and avoiding heavy or solid foods in large amounts prior to bedtime.  Encouraged her to continue with head of the bed elevated and work on diet and lifestyle modifications including weight loss efforts over the next 6 weeks.  We have scheduled a follow-up telephone visit in 6 weeks to assess response with the above plan.      If symptoms are improved, to consider decreasing a.m. dose of pantoprazole to every other day or decreasing to 20 mg daily while continuing dietary and lifestyle modifications.  If symptoms are not improved, to consider gastric emptying study and 24-hour pH impedance if clinically warranted at that time.    Patient verbalized agreement and understanding with the above plan, all questions answered and support provided.    If second dose of pantoprazole is not covered by insurance, we can provide information regarding [employer] [facility]lando and send this 40 mg once daily dosing to pharmacy of her choice based on [facility] /patient preference.      This visit has been rescheduled as a phone visit to comply with  patient safety concerns in accordance with CDC recommendations. Total time of discussion was 29 minutes.    Follow Up   No follow-ups on file.    Patient Instructions   For GERD, follow antireflux precautions.  Recommend avoiding eating within 3 to 4 hours of bedtime.  Avoid foods that can trigger symptoms which may include citrus fruits, spicy foods, tomatoes and red sauces, chocolate, coffee/tea, caffeinated or carbonated beverages, alcohol.    Stop omeprazole 20 mg in the morning.    Start pantoprazole 40 mg in the morning and continue 40 mg pantoprazole in the evening.  Would like to try twice daily pantoprazole 40 mg for 6 weeks.    Due for colon cancer screening in 5 years, this has been placed in our electronic reminder system.    Please contact the office with any questions or concerns otherwise strongly recommend dietary and lifestyle modifications for acid reflux as we discussed, changing acid medication and follow-up in 6 weeks.    If second dose of pantoprazole 40 mg in the morning is not covered by insurance, please contact our office and we can discuss alternative options including purchasing the second dose of pantoprazole 40 mg with [employer] [facility].       EMR Dragon/Transcription Disclaimer:  This document has been Dictated utilizing Dragon dictation.

## 2021-05-26 RX ORDER — PANTOPRAZOLE SODIUM 40 MG/1
40 TABLET, DELAYED RELEASE ORAL 2 TIMES DAILY
Qty: 180 TABLET | Refills: 1 | Status: SHIPPED | OUTPATIENT
Start: 2021-05-26 | End: 2021-12-14

## 2021-05-26 NOTE — PATIENT INSTRUCTIONS
For GERD, follow antireflux precautions.  Recommend avoiding eating within 3 to 4 hours of bedtime.  Avoid foods that can trigger symptoms which may include citrus fruits, spicy foods, tomatoes and red sauces, chocolate, coffee/tea, caffeinated or carbonated beverages, alcohol.    Stop omeprazole 20 mg in the morning.    Start pantoprazole 40 mg in the morning and continue 40 mg pantoprazole in the evening.  Would like to try twice daily pantoprazole 40 mg for 6 weeks.    Due for colon cancer screening in 5 years, this has been placed in our electronic reminder system.    Please contact the office with any questions or concerns otherwise strongly recommend dietary and lifestyle modifications for acid reflux as we discussed, changing acid medication and follow-up in 6 weeks.    If second dose of pantoprazole 40 mg in the morning is not covered by insurance, please contact our office and we can discuss alternative options including purchasing the second dose of pantoprazole 40 mg with [employer] [facility].

## 2021-07-13 ENCOUNTER — OFFICE VISIT (OUTPATIENT)
Dept: GASTROENTEROLOGY | Facility: CLINIC | Age: 51
End: 2021-07-13

## 2021-07-13 DIAGNOSIS — K21.9 GASTROESOPHAGEAL REFLUX DISEASE WITHOUT ESOPHAGITIS: Primary | ICD-10-CM

## 2021-07-13 PROCEDURE — 99442 PR PHYS/QHP TELEPHONE EVALUATION 11-20 MIN: CPT | Performed by: NURSE PRACTITIONER

## 2021-07-13 NOTE — PROGRESS NOTES
No chief complaint on file.          History of Present Illness  51-year-old female presents today for follow-up.  Last visit May 25, 2021.  Initial consult February 3, 2021 for acid reflux.  EGD and colonoscopy performed April 19, 2021.    Given nocturnal reflux despite over-the-counter Prilosec in the morning and pantoprazole in the evening patient was started on pantoprazole 40 mg twice daily.  She is no longer having episodes of nocturnal reflux that wake her up in the middle of the night since increasing to 40 mg twice daily.    With twice daily dosing, if she takes her a.m. dose later in the morning or misses the a.m. dose she will have significant burning in the epigastric area.  This is improved with twice daily dosing but is still present at times however significantly less noticeable.    She sleeps with her head of the bed elevated and avoids foods such as alcohol that worsen symptoms.    She has had 15+ pound weight loss since her last visit with increased physical activity and dietary and lifestyle modifications.    You have chosen to receive care through a telephone visit.   Do you consent to use a telephone visit for your medical care today? Yes     Physical Exam - TELEPHONE VISIT    Assessment and Plan    There are no diagnoses linked to this encounter.   This is a very pleasant 51-year-old.  She has had longstanding reflux symptoms but noticed worsening symptoms over the past several years despite acid suppression.  Recent EGD with irregular Z-line and changes of reflux.  Notable improvement with resolution of nocturnal symptoms with twice daily dosing of pantoprazole however she still has some mild reflux symptoms during the day despite maximum acid suppression.  Prior use of sucralfate was not tolerated well as the pill was too large.  She has not tried to crush the sucralfate tablet.    She continues with dietary and lifestyle modifications for acid reflux including increased physical activity and  weight loss.  These overall will benefit as discussed and efforts were encouraged.    Given ongoing symptoms however she has had improvement, will continue current regimen with pantoprazole 40 mg twice daily.  Recommend follow-up September or October 2021 based on school schedule to discuss status and determine how to proceed.  I do think she would benefit from mucosal barrier such as sucralfate crushed every other day or a couple of times a week and she can use Tums or Pepcid as needed however risks of high-dose acid suppression were made aware and ultimately our goal is to use lowest dose possible.  We also discussed anatomical findings including hiatal hernia with displacement of GE junction and lower esophageal sphincter which ultimately can make reflux more problematic and certain patients more symptomatic.    We will continue to closely monitor and patient verbalized agreement and understanding with the above plan.    To consider gastric emptying study.  To consider esophageal pH impedance testing.  Colon cancer screening, up-to-date, due in 5 years, April 2026, this has been placed in our electronic reminder system.        This visit has been rescheduled as a phone visit to comply with patient safety concerns in accordance with CDC recommendations. Total time of discussion was 21 minutes.    EMR Dragon/Transcription Disclaimer:  This document has been Dictated utilizing Dragon dictation.

## 2021-07-13 NOTE — PATIENT INSTRUCTIONS
For acid reflux, our goal is to use lowest dose of acid medication possible to control symptoms in addition to dietary and lifestyle modifications for acid reflux.    For GERD, follow antireflux precautions.  Recommend avoiding eating within 3 to 4 hours of bedtime.  Avoid foods that can trigger symptoms which may include citrus fruits, spicy foods, tomatoes and red sauces, chocolate, coffee/tea, caffeinated or carbonated beverages, alcohol.    Continue pantoprazole 40 mg twice daily.    Okay to use sucralfate crushed in 1 inch of water a couple of times a week to see if this helps with intermittent burning.    Okay to use Tums or over-the-counter Pepcid intermittently as needed as well.    Recommend follow-up visit September or October 2021, sooner if symptoms change or condition warrants.

## 2021-12-13 DIAGNOSIS — K21.9 GASTROESOPHAGEAL REFLUX DISEASE WITHOUT ESOPHAGITIS: ICD-10-CM

## 2021-12-14 RX ORDER — PANTOPRAZOLE SODIUM 40 MG/1
TABLET, DELAYED RELEASE ORAL
Qty: 180 TABLET | Refills: 1 | Status: SHIPPED | OUTPATIENT
Start: 2021-12-14 | End: 2022-03-21

## 2022-01-06 ENCOUNTER — APPOINTMENT (OUTPATIENT)
Dept: WOMENS IMAGING | Facility: HOSPITAL | Age: 52
End: 2022-01-06

## 2022-01-06 ENCOUNTER — PROCEDURE VISIT (OUTPATIENT)
Dept: OBSTETRICS AND GYNECOLOGY | Age: 52
End: 2022-01-06

## 2022-01-06 ENCOUNTER — OFFICE VISIT (OUTPATIENT)
Dept: OBSTETRICS AND GYNECOLOGY | Age: 52
End: 2022-01-06

## 2022-01-06 VITALS
WEIGHT: 198.6 LBS | HEIGHT: 66 IN | BODY MASS INDEX: 31.92 KG/M2 | DIASTOLIC BLOOD PRESSURE: 68 MMHG | SYSTOLIC BLOOD PRESSURE: 120 MMHG

## 2022-01-06 DIAGNOSIS — Z01.419 ENCOUNTER FOR GYNECOLOGICAL EXAMINATION WITHOUT ABNORMAL FINDING: Primary | ICD-10-CM

## 2022-01-06 DIAGNOSIS — Z12.4 SCREENING FOR CERVICAL CANCER: ICD-10-CM

## 2022-01-06 DIAGNOSIS — Z12.31 VISIT FOR SCREENING MAMMOGRAM: Primary | ICD-10-CM

## 2022-01-06 DIAGNOSIS — Z11.51 SCREENING FOR HPV (HUMAN PAPILLOMAVIRUS): ICD-10-CM

## 2022-01-06 PROCEDURE — 99396 PREV VISIT EST AGE 40-64: CPT | Performed by: OBSTETRICS & GYNECOLOGY

## 2022-01-06 PROCEDURE — 77063 BREAST TOMOSYNTHESIS BI: CPT | Performed by: RADIOLOGY

## 2022-01-06 PROCEDURE — 77063 BREAST TOMOSYNTHESIS BI: CPT | Performed by: OBSTETRICS & GYNECOLOGY

## 2022-01-06 PROCEDURE — 77067 SCR MAMMO BI INCL CAD: CPT | Performed by: RADIOLOGY

## 2022-01-06 PROCEDURE — 77067 SCR MAMMO BI INCL CAD: CPT | Performed by: OBSTETRICS & GYNECOLOGY

## 2022-01-06 RX ORDER — CETIRIZINE HYDROCHLORIDE 5 MG/1
5 TABLET ORAL DAILY
COMMUNITY

## 2022-01-06 RX ORDER — NAPROXEN 500 MG/1
500 TABLET ORAL 2 TIMES DAILY
COMMUNITY
Start: 2021-12-29 | End: 2023-01-09

## 2022-01-06 RX ORDER — HYDROCODONE BITARTRATE AND ACETAMINOPHEN 5; 325 MG/1; MG/1
1-2 TABLET ORAL EVERY 6 HOURS PRN
COMMUNITY
Start: 2021-11-15 | End: 2022-04-27

## 2022-01-06 NOTE — PROGRESS NOTES
Routine Annual Visit    2022    Patient: Makenzie Justin          MR#:[id number]      Chief Complaint   Patient presents with   • Gynecologic Exam     Mammo Today, Last Pap 19 (-), Last C/Scope 2021       History of Present Illness    51 y.o. female  who presents for annual exam.   mammo today  lilletta due out  unless they extend to 7 years  No menses  Pap not due but pt requests  CSC UTD- did have some polyps  Shin is 19, Neptali is 18, Celi 8th grade and Jovanny is 6th grade            No LMP recorded. Patient has had an implant.  Obstetric History:  OB History        6    Para   1    Term   1            AB   2    Living   4       SAB   1    IAB        Ectopic        Molar        Multiple        Live Births   4               Menstrual History:     No LMP recorded. Patient has had an implant.       Sexual History:       ________________________________________  Patient Active Problem List   Diagnosis   • Dysthymia   • Hyperglycemia   • Gastroesophageal reflux disease without esophagitis   • Ophthalmic migraine   • Encounter for screening for malignant neoplasm of colon       Past Medical History:   Diagnosis Date   • Allergic rhinitis    • Anxiety    • Depression    • GERD (gastroesophageal reflux disease)    • History of miscarriage    • IUD (intrauterine device) in place 2017    Lilletta   • Menorrhagia    • PMS (premenstrual syndrome)    • Stress at home    • Stress at work    • Vaginal delivery        Past Surgical History:   Procedure Laterality Date   • COLONOSCOPY N/A 2021    Procedure: COLONOSCOPY;  Surgeon: Hossein Ribera MD;  Location: [facility] [place] [facility] [place];  Service: Gastroenterology;  Laterality: N/A;   • DILATATION AND CURETTAGE     • ENDOSCOPY N/A 2021    Procedure: ESOPHAGOGASTRODUODENOSCOPY;  Surgeon: Hossein Ribera MD;  Location: [facility] [place] MA[facility] OR;  Service: Gastroenterology;  Laterality: N/A;       Social History     Tobacco Use   Smoking Status  "Never Smoker   Smokeless Tobacco Never Used       has a current medication list which includes the following prescription(s): cetirizine, desvenlafaxine succinate er, montelukast, olopatadine, pantoprazole, hydrocodone-acetaminophen, levocetirizine, and naproxen, and the following Facility-Administered Medications: levonorgestrel.  ________________________________________    Current contraception: IUD  History of abnormal Pap smear: no  Family history of Breast cancer:   Family history of uterine or ovarian cancer: no  [place] History of colon cancer/[place]ps: yes [place] [facility] [place]  History of abnormal mammogram: no      The following portions of the patient's history were reviewed and updated as appropriate: allergies, current medications, past family history, past medical history, past social history, past surgical history and problem list.    Review of Systems    Pertinent items are noted in HPI.     Objective   Physical Exam    /68   Ht 167.6 cm (66\")   Wt 90.1 kg (198 lb 9.6 oz)   Breastfeeding No   BMI 32.05 kg/m²    BP Readings from Last 3 Encounters:   01/06/22 120/68   04/19/21 104/61   02/03/21 140/90      Wt Readings from Last 3 Encounters:   01/06/22 90.1 kg (198 lb 9.6 oz)   04/19/21 93.4 kg (206 lb)   02/03/21 96.8 kg (213 lb 4.8 oz)      BMI: Estimated body mass index is 32.05 kg/m² as calculated from the following:    Height as of this encounter: 167.6 cm (66\").    Weight as of this encounter: 90.1 kg (198 lb 9.6 oz).      General:   alert, appears stated age and cooperative   Abdomen: soft, non-tender, without masses or organomegaly   Breast: inspection negative, no nipple discharge or bleeding, no masses or nodularity palpable   Vulva: normal   Vagina: normal mucosa   Cervix: no cervical motion tenderness and no lesions   Uterus: normal size, mobile and non-tender   Adnexa: no mass, fullness, tenderness     Assessment:    1. Normal annual exam   Assessment     ICD-10-CM ICD-9-CM   1. " Encounter for gynecological examination without abnormal finding  Z01.419 V72.31   2. Screening for cervical cancer  Z12.4 V76.2   3. Screening for HPV (human papillomavirus)  Z11.51 V73.81     Plan:    Plan     [x]  Mammogram request made  [x]  PAP done  []  Labs:   []  GC/Chl/TV  []  DEXA scan   []  Referral for colonoscopy:       Diagnoses and all orders for this visit:    1. Encounter for gynecological examination without abnormal finding (Primary)    2. Screening for cervical cancer  -     IGP, Apt HPV,rfx 16 / 18,45    3. Screening for HPV (human papillomavirus)  -     IGP, Apt HPV,rfx 16 / 18,45            Counseling:  --Nutrition: Stressed importance of moderation and caloric balance, stressed fresh fruit and vegetables  --Exercise: Stressed the importance of regular exercise. 3-5 times weekly   - Discussed screening mammogram recommendations.   --Discussed benefits of screening colonoscopy- age 45 unless FH  --Discussed pap smear screening recommendations

## 2022-01-10 LAB
CYTOLOGIST CVX/VAG CYTO: NORMAL
CYTOLOGY CVX/VAG DOC CYTO: NORMAL
CYTOLOGY CVX/VAG DOC THIN PREP: NORMAL
DX ICD CODE: NORMAL
HIV 1 & 2 AB SER-IMP: NORMAL
HPV I/H RISK 4 DNA CVX QL PROBE+SIG AMP: NEGATIVE
OTHER STN SPEC: NORMAL
STAT OF ADQ CVX/VAG CYTO-IMP: NORMAL

## 2022-02-23 DIAGNOSIS — M79.2 NEUROPATHIC PAIN: Primary | ICD-10-CM

## 2022-02-23 RX ORDER — GABAPENTIN 300 MG/1
300 CAPSULE ORAL 3 TIMES DAILY
Qty: 90 CAPSULE | Refills: 2 | Status: SHIPPED | OUTPATIENT
Start: 2022-02-23 | End: 2022-04-27

## 2022-03-17 DIAGNOSIS — K21.9 GASTROESOPHAGEAL REFLUX DISEASE WITHOUT ESOPHAGITIS: ICD-10-CM

## 2022-03-21 RX ORDER — PANTOPRAZOLE SODIUM 40 MG/1
TABLET, DELAYED RELEASE ORAL
Qty: 90 TABLET | Refills: 0 | Status: SHIPPED | OUTPATIENT
Start: 2022-03-21 | End: 2022-10-19

## 2022-04-15 DIAGNOSIS — F34.1 DYSTHYMIA: ICD-10-CM

## 2022-04-19 RX ORDER — DESVENLAFAXINE 25 MG/1
TABLET, EXTENDED RELEASE ORAL
Qty: 90 TABLET | Refills: 3 | Status: SHIPPED | OUTPATIENT
Start: 2022-04-19 | End: 2022-04-27

## 2022-04-27 ENCOUNTER — OFFICE VISIT (OUTPATIENT)
Dept: SPORTS MEDICINE | Facility: CLINIC | Age: 52
End: 2022-04-27

## 2022-04-27 ENCOUNTER — LAB (OUTPATIENT)
Dept: LAB | Facility: HOSPITAL | Age: 52
End: 2022-04-27

## 2022-04-27 VITALS
WEIGHT: 209.6 LBS | TEMPERATURE: 94.6 F | SYSTOLIC BLOOD PRESSURE: 122 MMHG | HEART RATE: 76 BPM | BODY MASS INDEX: 33.68 KG/M2 | DIASTOLIC BLOOD PRESSURE: 78 MMHG | OXYGEN SATURATION: 99 % | HEIGHT: 66 IN

## 2022-04-27 DIAGNOSIS — F34.1 DYSTHYMIA: Chronic | ICD-10-CM

## 2022-04-27 DIAGNOSIS — R73.9 HYPERGLYCEMIA: Chronic | ICD-10-CM

## 2022-04-27 DIAGNOSIS — R41.3 MEMORY DIFFICULTIES: Primary | Chronic | ICD-10-CM

## 2022-04-27 LAB
ALBUMIN SERPL-MCNC: 4.7 G/DL (ref 3.5–5.2)
ALBUMIN/GLOB SERPL: 1.8 G/DL
ALP SERPL-CCNC: 134 U/L (ref 39–117)
ALT SERPL W P-5'-P-CCNC: 19 U/L (ref 1–33)
ANION GAP SERPL CALCULATED.3IONS-SCNC: 9.1 MMOL/L (ref 5–15)
AST SERPL-CCNC: 19 U/L (ref 1–32)
BASOPHILS # BLD AUTO: 0.05 10*3/MM3 (ref 0–0.2)
BASOPHILS NFR BLD AUTO: 0.6 % (ref 0–1.5)
BILIRUB SERPL-MCNC: 0.3 MG/DL (ref 0–1.2)
BUN SERPL-MCNC: 11 MG/DL (ref 6–20)
BUN/CREAT SERPL: 11.7 (ref 7–25)
CALCIUM SPEC-SCNC: 9.1 MG/DL (ref 8.6–10.5)
CHLORIDE SERPL-SCNC: 101 MMOL/L (ref 98–107)
CO2 SERPL-SCNC: 28.9 MMOL/L (ref 22–29)
CREAT SERPL-MCNC: 0.94 MG/DL (ref 0.57–1)
DEPRECATED RDW RBC AUTO: 38.1 FL (ref 37–54)
EGFRCR SERPLBLD CKD-EPI 2021: 74.5 ML/MIN/1.73
EOSINOPHIL # BLD AUTO: 0.07 10*3/MM3 (ref 0–0.4)
EOSINOPHIL NFR BLD AUTO: 0.8 % (ref 0.3–6.2)
ERYTHROCYTE [DISTWIDTH] IN BLOOD BY AUTOMATED COUNT: 13 % (ref 12.3–15.4)
FOLATE SERPL-MCNC: 8.64 NG/ML (ref 4.78–24.2)
GLOBULIN UR ELPH-MCNC: 2.6 GM/DL
GLUCOSE SERPL-MCNC: 110 MG/DL (ref 65–99)
HBA1C MFR BLD: 5.3 % (ref 4.8–5.6)
HCT VFR BLD AUTO: 42.3 % (ref 34–46.6)
HGB BLD-MCNC: 14.2 G/DL (ref 12–15.9)
IMM GRANULOCYTES # BLD AUTO: 0.02 10*3/MM3 (ref 0–0.05)
IMM GRANULOCYTES NFR BLD AUTO: 0.2 % (ref 0–0.5)
LYMPHOCYTES # BLD AUTO: 1.41 10*3/MM3 (ref 0.7–3.1)
LYMPHOCYTES NFR BLD AUTO: 17 % (ref 19.6–45.3)
MCH RBC QN AUTO: 27.4 PG (ref 26.6–33)
MCHC RBC AUTO-ENTMCNC: 33.6 G/DL (ref 31.5–35.7)
MCV RBC AUTO: 81.5 FL (ref 79–97)
MONOCYTES # BLD AUTO: 0.45 10*3/MM3 (ref 0.1–0.9)
MONOCYTES NFR BLD AUTO: 5.4 % (ref 5–12)
NEUTROPHILS NFR BLD AUTO: 6.28 10*3/MM3 (ref 1.7–7)
NEUTROPHILS NFR BLD AUTO: 76 % (ref 42.7–76)
NRBC BLD AUTO-RTO: 0 /100 WBC (ref 0–0.2)
PLATELET # BLD AUTO: 342 10*3/MM3 (ref 140–450)
PMV BLD AUTO: 8.9 FL (ref 6–12)
POTASSIUM SERPL-SCNC: 3.6 MMOL/L (ref 3.5–5.2)
PROT SERPL-MCNC: 7.3 G/DL (ref 6–8.5)
RBC # BLD AUTO: 5.19 10*6/MM3 (ref 3.77–5.28)
SODIUM SERPL-SCNC: 139 MMOL/L (ref 136–145)
T3FREE SERPL-MCNC: 3.4 PG/ML (ref 2–4.4)
T4 FREE SERPL-MCNC: 1.19 NG/DL (ref 0.93–1.7)
TSH SERPL DL<=0.05 MIU/L-ACNC: 1.96 UIU/ML (ref 0.27–4.2)
VIT B12 BLD-MCNC: 627 PG/ML (ref 211–946)
WBC NRBC COR # BLD: 8.28 10*3/MM3 (ref 3.4–10.8)

## 2022-04-27 PROCEDURE — 82746 ASSAY OF FOLIC ACID SERUM: CPT | Performed by: FAMILY MEDICINE

## 2022-04-27 PROCEDURE — 36415 COLL VENOUS BLD VENIPUNCTURE: CPT | Performed by: FAMILY MEDICINE

## 2022-04-27 PROCEDURE — 84443 ASSAY THYROID STIM HORMONE: CPT | Performed by: FAMILY MEDICINE

## 2022-04-27 PROCEDURE — 84481 FREE ASSAY (FT-3): CPT | Performed by: FAMILY MEDICINE

## 2022-04-27 PROCEDURE — 83036 HEMOGLOBIN GLYCOSYLATED A1C: CPT | Performed by: FAMILY MEDICINE

## 2022-04-27 PROCEDURE — 82607 VITAMIN B-12: CPT | Performed by: FAMILY MEDICINE

## 2022-04-27 PROCEDURE — 84439 ASSAY OF FREE THYROXINE: CPT | Performed by: FAMILY MEDICINE

## 2022-04-27 PROCEDURE — 99214 OFFICE O/P EST MOD 30 MIN: CPT | Performed by: FAMILY MEDICINE

## 2022-04-27 PROCEDURE — 80053 COMPREHEN METABOLIC PANEL: CPT | Performed by: FAMILY MEDICINE

## 2022-04-27 PROCEDURE — 85025 COMPLETE CBC W/AUTO DIFF WBC: CPT | Performed by: FAMILY MEDICINE

## 2022-04-27 RX ORDER — VILAZODONE HYDROCHLORIDE 10 MG/1
10 TABLET ORAL DAILY
Qty: 30 TABLET | Refills: 11 | Status: SHIPPED | OUTPATIENT
Start: 2022-04-27 | End: 2023-01-09

## 2022-04-27 NOTE — PROGRESS NOTES
"Chief Complaint  Memory Loss (Memory has gotten worse over the last few years - would like further eval )    Subjective          Makenzie Justin presents to [facility]  History of Present Illness  Patient has noted a decline in her memory over the past few years but seems of gotten worse over the past few months.  Items that she has trouble with are typically names of people she has known for many years, conversations she has had with her children.No episodes getting lost while driving etc.       She has also had some symptoms of depression with fatigue, lack of libido.  Her gynecologist has told her that she is in perimenopause.  In the past for her dysthymia we have tried Wellbutrin, Lexapro, sertraline and is currently on Pristiq.  Objective   Vital Signs:   /78 (BP Location: Left arm, Patient Position: Sitting, Cuff Size: Adult)   Pulse 76   Temp 94.6 °F (34.8 °C) (Temporal)   Ht 167.6 cm (66\")   Wt 95.1 kg (209 lb 9.6 oz)   SpO2 99%   BMI 33.83 kg/m²     Physical Exam  Vitals reviewed.   Constitutional:       Appearance: She is well-developed.   HENT:      Head: Normocephalic and atraumatic.   Eyes:      Conjunctiva/sclera: Conjunctivae normal.      Pupils: Pupils are equal, round, and reactive to light.   Cardiovascular:      Comments: No peripheral edema  Pulmonary:      Effort: Pulmonary effort is normal.   Skin:     General: Skin is warm and dry.   Neurological:      General: No focal deficit present.      Mental Status: She is alert and oriented to person, place, and time.   Psychiatric:         Behavior: Behavior normal.         Thought Content: Thought content normal.         Judgment: Judgment normal.      Comments: Dysthymia, no SI or HI.        Result Review :                 Assessment and Plan    Diagnoses and all orders for this visit:    1. Memory difficulties (Primary)  -     CBC & Differential  -     Comprehensive Metabolic Panel  -     TSH  -     " T4, Free  -     T3, Free  -     Vitamin B12  -     Folate  -     CT head wo contrast; Future  -     vilazodone (VIIBRYD) 10 MG tablet tablet; Take 1 tablet by mouth Daily.  Dispense: 30 tablet; Refill: 11  -     Hemoglobin A1c    2. Dysthymia  -     CBC & Differential  -     Comprehensive Metabolic Panel  -     TSH  -     T4, Free  -     T3, Free  -     Vitamin B12  -     Folate  -     vilazodone (VIIBRYD) 10 MG tablet tablet; Take 1 tablet by mouth Daily.  Dispense: 30 tablet; Refill: 11  -     Hemoglobin A1c    3. Hyperglycemia  -     Hemoglobin A1c          If the labs and CT are normal then would like to see have the change in antidepressants goes, if no significant improvement in memory issues are dysthymia that would consider neuropsych evaluation           Follow Up   No follow-ups on file.  Patient was given instructions and counseling regarding her condition or for health maintenance advice. Please see specific information pulled into the AVS if appropriate.

## 2022-04-28 DIAGNOSIS — R74.8 ELEVATED ALKALINE PHOSPHATASE LEVEL: Primary | ICD-10-CM

## 2022-06-10 ENCOUNTER — HOSPITAL ENCOUNTER (OUTPATIENT)
Dept: ULTRASOUND IMAGING | Facility: HOSPITAL | Age: 52
Discharge: HOME | End: 2022-06-10

## 2022-06-10 ENCOUNTER — HOSPITAL ENCOUNTER (OUTPATIENT)
Dept: CT IMAGING | Facility: HOSPITAL | Age: 52
Discharge: HOME | End: 2022-06-10

## 2022-06-10 DIAGNOSIS — R41.3 MEMORY DIFFICULTIES: Chronic | ICD-10-CM

## 2022-06-10 DIAGNOSIS — R74.8 ELEVATED ALKALINE PHOSPHATASE LEVEL: ICD-10-CM

## 2022-06-10 PROCEDURE — 76705 ECHO EXAM OF ABDOMEN: CPT

## 2022-06-10 PROCEDURE — 70450 CT HEAD/BRAIN W/O DYE: CPT

## 2022-10-14 DIAGNOSIS — K21.9 GASTROESOPHAGEAL REFLUX DISEASE WITHOUT ESOPHAGITIS: ICD-10-CM

## 2022-10-19 ENCOUNTER — TELEPHONE (OUTPATIENT)
Dept: GASTROENTEROLOGY | Facility: CLINIC | Age: 52
End: 2022-10-19

## 2022-10-19 DIAGNOSIS — K21.9 GASTROESOPHAGEAL REFLUX DISEASE WITHOUT ESOPHAGITIS: ICD-10-CM

## 2022-10-19 RX ORDER — PANTOPRAZOLE SODIUM 40 MG/1
40 TABLET, DELAYED RELEASE ORAL 2 TIMES DAILY
Qty: 180 TABLET | Refills: 2 | Status: SHIPPED | OUTPATIENT
Start: 2022-10-19

## 2022-10-19 RX ORDER — PANTOPRAZOLE SODIUM 40 MG/1
40 TABLET, DELAYED RELEASE ORAL DAILY
Qty: 30 TABLET | Refills: 0 | Status: SHIPPED | OUTPATIENT
Start: 2022-10-19 | End: 2022-10-19 | Stop reason: SDUPTHER

## 2022-10-19 NOTE — TELEPHONE ENCOUNTER
"Patient called requesting a new script for her pantoprazole 40 MG EC. Chart shows a script done today from another provider's office Dr. Robert Mendoza, 30 tablets with 0 refills. Patient also stated when she tried to fill it at [facility] she was told it was \"too early.\" Please review and advise, thanks  "

## 2023-01-09 ENCOUNTER — PROCEDURE VISIT (OUTPATIENT)
Dept: OBSTETRICS AND GYNECOLOGY | Age: 53
End: 2023-01-09
Payer: COMMERCIAL

## 2023-01-09 ENCOUNTER — OFFICE VISIT (OUTPATIENT)
Dept: OBSTETRICS AND GYNECOLOGY | Age: 53
End: 2023-01-09
Payer: COMMERCIAL

## 2023-01-09 VITALS
HEIGHT: 66 IN | SYSTOLIC BLOOD PRESSURE: 120 MMHG | DIASTOLIC BLOOD PRESSURE: 76 MMHG | BODY MASS INDEX: 34.23 KG/M2 | WEIGHT: 213 LBS

## 2023-01-09 DIAGNOSIS — Z12.31 VISIT FOR SCREENING MAMMOGRAM: Primary | ICD-10-CM

## 2023-01-09 DIAGNOSIS — Z01.419 ENCOUNTER FOR GYNECOLOGICAL EXAMINATION WITHOUT ABNORMAL FINDING: Primary | ICD-10-CM

## 2023-01-09 PROCEDURE — 99396 PREV VISIT EST AGE 40-64: CPT | Performed by: OBSTETRICS & GYNECOLOGY

## 2023-01-09 PROCEDURE — 77067 SCR MAMMO BI INCL CAD: CPT | Performed by: OBSTETRICS & GYNECOLOGY

## 2023-01-09 PROCEDURE — 77063 BREAST TOMOSYNTHESIS BI: CPT | Performed by: OBSTETRICS & GYNECOLOGY

## 2023-01-09 RX ORDER — DESVENLAFAXINE 25 MG/1
25 TABLET, EXTENDED RELEASE ORAL DAILY
Qty: 90 TABLET | Refills: 3 | Status: SHIPPED | OUTPATIENT
Start: 2023-01-09

## 2023-01-09 RX ORDER — DESVENLAFAXINE 25 MG/1
25 TABLET, EXTENDED RELEASE ORAL DAILY
COMMUNITY
Start: 2022-10-19 | End: 2023-01-09 | Stop reason: SDUPTHER

## 2023-01-09 NOTE — PROGRESS NOTES
Routine Annual Visit    2023    Patient: Makenzie Justin          MR#:[id number]      Chief Complaint   Patient presents with   • Gynecologic Exam     Mammo @ 2:40, Last Pap 22 (-), Last C/Scope 2021, No Concerns at this time       History of Present Illness    52 y.o. female  who presents for annual exam.   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 Patient has had an implant.  Obstetric History:  OB History        6    Para   1    Term   1            AB   2    Living   4       SAB   1    IAB        Ectopic        Molar        Multiple        Live Births   4               Menstrual History:     No LMP recorded (exact date). Patient has had an implant.       Sexual History:       ________________________________________  Patient Active Problem List   Diagnosis   • Dysthymia   • Hyperglycemia   • Gastroesophageal reflux disease without esophagitis   • Ophthalmic migraine   • Encounter for screening for malignant neoplasm of colon       Past Medical History:   Diagnosis Date   • Allergic rhinitis    • Anxiety    • Depression    • GERD (gastroesophageal reflux disease)    • History of miscarriage    • IUD (intrauterine device) in place 2017    Lilletta   • Menorrhagia    • PMS (premenstrual syndrome)    • Stress at home    • Stress at work    • Vaginal delivery        Past Surgical History:   Procedure Laterality Date   • COLONOSCOPY N/A 2021    Procedure: COLONOSCOPY;  Surgeon: Hossein Ribera MD;  [place] [facility] [place]  Service: Gastroenterology;  Laterality: N/A;   • DILATATION AND CURETTAGE     • ENDOSCOPY N/A 2021    Procedure: ESOPHAGOGASTRODUODENOSCOPY;   Surgeon: Hossein Ribera MD;  Location: [place] MAIN OR;  Service: Gastroenterology;  Laterality: N/A;       Social History     Tobacco Use   Smoking Status Never   Smokeless Tobacco Never       has a current medication list which includes the following prescription(s): cetirizine, desvenlafaxine succinate er, levocetirizine, montelukast, olopatadine, and pantoprazole, and the following Facility-Administered Medications: levonorgestrel.  ________________________________________    Current contraception: IUD  History of abnormal Pap smear: no  Family history of Breast cancer:   Family history of uterine or ovarian cancer: no  Family History of colon cancer/colon polyps: no  History of abnormal mammogram: no      The following portions of the patient's history were reviewed and updated as appropriate: allergies, current medications, past family history, past medical history, past social history, past surgical history and problem list.    Review of Systems    Pertinent items are noted in HPI.     Objective   Physical Exam    /76   Ht 167.6 cm (66\")   Wt 96.6 kg (213 lb)   LMP  (Exact Date)   BMI 34.38 kg/m²    BP Readings from Last 3 Encounters:   01/09/23 120/76   04/27/22 122/78   01/06/22 120/68      Wt Readings from Last 3 Encounters:   01/09/23 96.6 kg (213 lb)   04/27/22 95.1 kg (209 lb 9.6 oz)   01/06/22 90.1 kg (198 lb 9.6 oz)      BMI: Estimated body mass index is 34.38 kg/m² as calculated from the following:    Height as of this encounter: 167.6 cm (66\").    Weight as of this encounter: 96.6 kg (213 lb).      General:   alert, appears stated age and cooperative   Abdomen: soft, non-tender, without masses or organomegaly   Breast: inspection negative, no nipple discharge or bleeding, no masses or nodularity palpable   Vulva: normal   Vagina: normal mucosa   Cervix: no cervical motion tenderness and no lesions   Uterus: normal size, mobile and non-tender   Adnexa: no mass, fullness, tenderness      Assessment:    1. Normal annual exam   Assessment     ICD-10-CM ICD-9-CM   1. Encounter for gynecological examination without abnormal finding  Z01.419 V72.31     Plan:    Plan     [x]  Mammogram request made  []  PAP done  []  Labs:   []  GC/Chl/TV  []  DEXA scan   []  Referral for colonoscopy:       Diagnoses and all orders for this visit:    1. Encounter for gynecological examination without abnormal finding (Primary)    Other orders  -     Desvenlafaxine Succinate ER 25 MG tablet sustained-release 24 hour; Take 1 tablet by mouth Daily.  Dispense: 90 tablet; Refill: 3            Counseling:  --Nutrition: Stressed importance of moderation and caloric balance, stressed fresh fruit and vegetables  --Exercise: Stressed the importance of regular exercise. 3-5 times weekly   - Discussed screening mammogram recommendations.   --Discussed benefits of screening colonoscopy- age 45 unless FH  --Discussed pap smear screening recommendations

## 2023-08-08 ENCOUNTER — OFFICE VISIT (OUTPATIENT)
Dept: GASTROENTEROLOGY | Facility: CLINIC | Age: 53
End: 2023-08-08
Payer: COMMERCIAL

## 2023-08-08 VITALS
TEMPERATURE: 96.6 F | WEIGHT: 188.5 LBS | HEIGHT: 66 IN | DIASTOLIC BLOOD PRESSURE: 80 MMHG | OXYGEN SATURATION: 97 % | BODY MASS INDEX: 30.29 KG/M2 | SYSTOLIC BLOOD PRESSURE: 120 MMHG | HEART RATE: 95 BPM

## 2023-08-08 DIAGNOSIS — K44.9 HIATAL HERNIA: ICD-10-CM

## 2023-08-08 DIAGNOSIS — K21.9 GASTROESOPHAGEAL REFLUX DISEASE WITHOUT ESOPHAGITIS: Primary | ICD-10-CM

## 2023-08-08 DIAGNOSIS — Z12.11 ENCOUNTER FOR SCREENING FOR MALIGNANT NEOPLASM OF COLON: ICD-10-CM

## 2023-08-08 PROCEDURE — 99213 OFFICE O/P EST LOW 20 MIN: CPT | Performed by: NURSE PRACTITIONER

## 2023-08-08 RX ORDER — PANTOPRAZOLE SODIUM 40 MG/1
40 TABLET, DELAYED RELEASE ORAL 2 TIMES DAILY
Qty: 180 TABLET | Refills: 3 | Status: SHIPPED | OUTPATIENT
Start: 2023-08-08

## 2023-08-08 NOTE — PROGRESS NOTES
"Chief Complaint   Patient presents with    Follow-up     Heartburn           History of Present Illness  53-year-old female presents today for follow-up.  Last visit July 13, 2021.  Initial consult February 3, 2021 for acid reflux.  EGD and colonoscopy performed April 19, 2021.    She presents today for refills of acid medication.    Given nocturnal reflux and persistent epigastric burning she is on pantoprazole 40 mg twice daily.  Most days she takes pantoprazole 40 mg once daily but if she is having worsening reflux she will take the second dose later in the day.    Her weight is down 22 pounds since her last office visit April 2021.    Result Review :           Vital Signs:   /80   Pulse 95   Temp 96.6 øF (35.9 øC)   Ht 167.6 cm (65.98\")   Wt 85.5 kg (188 lb 8 oz)   SpO2 97%   BMI 30.44 kg/mý     Body mass index is 30.44 kg/mý.     Physical Exam      Assessment and Plan    Diagnoses and all orders for this visit:    1. Gastroesophageal reflux disease without esophagitis (Primary)  -     pantoprazole (PROTONIX) 40 MG EC tablet; Take 1 tablet by mouth 2 (Two) Times a Day.  Dispense: 180 tablet; Refill: 3    2. Hiatal hernia  -     pantoprazole (PROTONIX) 40 MG EC tablet; Take 1 tablet by mouth 2 (Two) Times a Day.  Dispense: 180 tablet; Refill: 3    3. Encounter for screening for malignant neoplasm of colon         Colon cancer screening up-to-date due for screening colonoscopy April 2026, this has been placed in our electronic reminder system.    She has been able to decrease pantoprazole to once daily some days of the week, this is an improvement, in the past she had to take 40 mg twice daily for persistent reflux symptoms.  Weight loss efforts encouraged, dietary and lifestyle modifications for acid reflux and hiatal hernia reviewed, follow-up visit yearly.  Colon cancer screening up-to-date, due for colonoscopy 2026.    Our goal is lowest dose of acid medication possible to control " symptoms.          Patient Instructions   For GERD, follow antireflux precautions.  Recommend avoiding eating within 3 to 4 hours of bedtime.  Avoid foods that can trigger symptoms which may include citrus fruits, spicy foods, tomatoes and red sauces, chocolate, coffee/tea, caffeinated or carbonated beverages, alcohol.     For acid reflux, our goal is to use lowest dose of acid medication possible to control symptoms in addition to dietary and lifestyle modifications for acid reflux.     Colon cancer screening up-to-date due for screening colonoscopy April 2026, this has been placed in our electronic reminder system.    Follow up visit yearly, sooner if symptoms persist or condition warrants        EMR Dragon/Transcription Disclaimer:  This document has been Dictated utilizing Dragon dictation.

## 2023-08-08 NOTE — PATIENT INSTRUCTIONS
For GERD, follow antireflux precautions.  Recommend avoiding eating within 3 to 4 hours of bedtime.  Avoid foods that can trigger symptoms which may include citrus fruits, spicy foods, tomatoes and red sauces, chocolate, coffee/tea, caffeinated or carbonated beverages, alcohol.     For acid reflux, our goal is to use lowest dose of acid medication possible to control symptoms in addition to dietary and lifestyle modifications for acid reflux.     Colon cancer screening up-to-date due for screening colonoscopy April 2026, this has been placed in our electronic reminder system.    Follow up visit yearly, sooner if symptoms persist or condition warrants  
<<-----Click here for Discharge Medication Review

## 2024-01-17 RX ORDER — DESVENLAFAXINE 25 MG/1
25 TABLET, EXTENDED RELEASE ORAL DAILY
Qty: 90 TABLET | Refills: 0 | Status: SHIPPED | OUTPATIENT
Start: 2024-01-17

## 2024-01-22 ENCOUNTER — TELEPHONE (OUTPATIENT)
Dept: OBSTETRICS AND GYNECOLOGY | Age: 54
End: 2024-01-22
Payer: COMMERCIAL

## 2024-02-08 ENCOUNTER — OFFICE VISIT (OUTPATIENT)
Dept: OBSTETRICS AND GYNECOLOGY | Age: 54
End: 2024-02-08
Payer: COMMERCIAL

## 2024-02-08 ENCOUNTER — HOSPITAL ENCOUNTER (OUTPATIENT)
Facility: HOSPITAL | Age: 54
Discharge: HOME | End: 2024-02-08
Admitting: OBSTETRICS & GYNECOLOGY
Payer: COMMERCIAL

## 2024-02-08 VITALS
WEIGHT: 206 LBS | BODY MASS INDEX: 33.11 KG/M2 | SYSTOLIC BLOOD PRESSURE: 120 MMHG | DIASTOLIC BLOOD PRESSURE: 80 MMHG | HEIGHT: 66 IN

## 2024-02-08 DIAGNOSIS — Z01.419 ENCOUNTER FOR GYNECOLOGICAL EXAMINATION WITHOUT ABNORMAL FINDING: Primary | ICD-10-CM

## 2024-02-08 DIAGNOSIS — Z12.31 VISIT FOR SCREENING MAMMOGRAM: ICD-10-CM

## 2024-02-08 DIAGNOSIS — Z12.31 SCREENING MAMMOGRAM FOR BREAST CANCER: ICD-10-CM

## 2024-02-08 PROCEDURE — 77063 BREAST TOMOSYNTHESIS BI: CPT

## 2024-02-08 PROCEDURE — 77067 SCR MAMMO BI INCL CAD: CPT

## 2024-02-08 NOTE — PROGRESS NOTES
Routine Annual Visit    2024    Patient: Makenzie Justin          MR#:[id number]      Chief Complaint   Patient presents with    Gynecologic Exam     Annual Exam - last pap 22 neg, mammo today, colonoscopy 21, mirena IUD inserted 17, pt has no complaints today       History of Present Illness    53 y.o. female  who presents for annual exam.     Feels well,   No gyn issue  In a boot, ankle injury playing volleyball  Kids well, [employer] freshman in [place]  Pap UTD  Mammo today  CSC UTD  No HF or NS        No LMP recorded. Patient has had an implant.  Obstetric History:  OB History          6    Para   1    Term   1            AB   2    Living   4         SAB   1    IAB        Ectopic        Molar        Multiple        Live Births   4               Menstrual History:     No LMP recorded. Patient has had an implant.       Sexual History:       ________________________________________  Patient Active Problem List   Diagnosis    Dysthymia    Hyperglycemia    Gastroesophageal reflux disease without esophagitis    Ophthalmic migraine    Encounter for screening for malignant neoplasm of colon       Past Medical History:   Diagnosis Date    Allergic rhinitis     Anxiety     Depression     GERD (gastroesophageal reflux disease)     History of miscarriage     IUD (intrauterine device) in place 2017    [place]    Menorrhagia     PMS (premenstrual syndrome)     Stress at home     Stress at work     Vaginal delivery        Past Surgical History:   Procedure Laterality Date    COLONOSCOPY N/A 2021    Procedure: COLONOSCOPY;  Surgeon: Hossein Ribera MD;  Location: [facility] MAIN OR;  Service: Gastroenterology;  Laterality: N/A;    DILATATION AND CURETTAGE      ENDOSCOPY N/A 2021    Procedure: ESOPHAGOGASTRODUODENOSCOPY;  Surgeon: Hossein Ribera MD;  Location: [facility] MAIN OR;  Service: Gastroenterology;  Laterality: N/A;       Social History     Tobacco Use   Smoking Status  "Never   Smokeless Tobacco Never       has a current medication list which includes the following prescription(s): desvenlafaxine succinate er, levocetirizine, montelukast, and pantoprazole, and the following Facility-Administered Medications: levonorgestrel.  ________________________________________    Current contraception: IUD  History of abnormal Pap smear: no  Family history of Breast cancer: no  Family history of uterine or ovarian cancer: no  Family History of colon cancer/colon polyps: no  History of abnormal mammogram: no      The following portions of the patient's history were reviewed and updated as appropriate: allergies, current medications, past family history, past medical history, past social history, past surgical history, and problem list.    Review of Systems    Pertinent items are noted in HPI.     Objective   Physical Exam    /80   Ht 167.6 cm (65.98\")   Wt 93.4 kg (206 lb)   BMI 33.27 kg/m²    BP Readings from Last 3 Encounters:   02/08/24 120/80   08/08/23 120/80   01/09/23 120/76      Wt Readings from Last 3 Encounters:   02/08/24 93.4 kg (206 lb)   08/08/23 85.5 kg (188 lb 8 oz)   01/09/23 96.6 kg (213 lb)      BMI: Estimated body mass index is 33.27 kg/m² as calculated from the following:    Height as of this encounter: 167.6 cm (65.98\").    Weight as of this encounter: 93.4 kg (206 lb).      General:   alert, appears stated age, and cooperative   Abdomen: soft, non-tender, without masses or organomegaly   Breast: inspection negative, no nipple discharge or bleeding, no masses or nodularity palpable   Vulva: normal   Vagina: normal mucosa   Cervix: no cervical motion tenderness and no lesions   Uterus: normal size, mobile, and non-tender   Adnexa: no mass, fullness, tenderness     Assessment:    1. Normal annual exam   Assessment     ICD-10-CM ICD-9-CM   1. Encounter for gynecological examination without abnormal finding  Z01.419 V72.31   2. Screening mammogram for breast cancer  " Z12.31 V76.12     Plan:    Plan     [x]  Mammogram request made  []  PAP done  []  Labs:   []  GC/Chl/TV  []  DEXA scan   []  Referral for colonoscopy:       Diagnoses and all orders for this visit:    1. Encounter for gynecological examination without abnormal finding (Primary)    2. Screening mammogram for breast cancer  -     Mammo Screening Digital Tomosynthesis Bilateral With CAD; Future            Counseling:  --Nutrition: Stressed importance of moderation and caloric balance, stressed fresh fruit and vegetables  --Exercise: Stressed the importance of regular exercise. 3-5 times weekly   - Discussed screening mammogram recommendations.   --Discussed benefits of screening colonoscopy- age 45 unless FH  --Discussed pap smear screening recommendations

## 2024-04-17 DIAGNOSIS — K44.9 HIATAL HERNIA: ICD-10-CM

## 2024-04-17 DIAGNOSIS — K21.9 GASTROESOPHAGEAL REFLUX DISEASE WITHOUT ESOPHAGITIS: ICD-10-CM

## 2024-04-17 RX ORDER — PANTOPRAZOLE SODIUM 40 MG/1
40 TABLET, DELAYED RELEASE ORAL 2 TIMES DAILY
Qty: 180 TABLET | Refills: 3 | Status: SHIPPED | OUTPATIENT
Start: 2024-04-17

## 2024-04-17 RX ORDER — DESVENLAFAXINE 25 MG/1
25 TABLET, EXTENDED RELEASE ORAL DAILY
Qty: 90 TABLET | Refills: 0 | OUTPATIENT
Start: 2024-04-17

## 2024-04-17 NOTE — TELEPHONE ENCOUNTER
Doesn't look like this med was discussed at previous visit.  She should discuss with PCP and they can reorder if appropriate.

## 2024-04-18 RX ORDER — DESVENLAFAXINE 25 MG/1
25 TABLET, EXTENDED RELEASE ORAL DAILY
Qty: 90 TABLET | Refills: 1 | Status: SHIPPED | OUTPATIENT
Start: 2024-04-18

## 2024-07-25 ENCOUNTER — OFFICE VISIT (OUTPATIENT)
Dept: GASTROENTEROLOGY | Facility: CLINIC | Age: 54
End: 2024-07-25
Payer: COMMERCIAL

## 2024-07-25 VITALS
HEIGHT: 66 IN | DIASTOLIC BLOOD PRESSURE: 90 MMHG | SYSTOLIC BLOOD PRESSURE: 144 MMHG | BODY MASS INDEX: 33.41 KG/M2 | HEART RATE: 77 BPM | TEMPERATURE: 97.7 F | WEIGHT: 207.9 LBS | OXYGEN SATURATION: 97 %

## 2024-07-25 DIAGNOSIS — Z12.11 ENCOUNTER FOR SCREENING FOR MALIGNANT NEOPLASM OF COLON: ICD-10-CM

## 2024-07-25 DIAGNOSIS — K44.9 HIATAL HERNIA: ICD-10-CM

## 2024-07-25 DIAGNOSIS — Z86.010 PERSONAL HISTORY OF COLONIC POLYPS: Primary | ICD-10-CM

## 2024-07-25 DIAGNOSIS — K21.9 GASTROESOPHAGEAL REFLUX DISEASE WITHOUT ESOPHAGITIS: ICD-10-CM

## 2024-07-25 RX ORDER — TRETINOIN 0.25 MG/G
CREAM TOPICAL
COMMUNITY
Start: 2024-06-21 | End: 2024-09-15

## 2024-07-25 RX ORDER — PANTOPRAZOLE SODIUM 40 MG/1
40 TABLET, DELAYED RELEASE ORAL 2 TIMES DAILY
Qty: 180 TABLET | Refills: 3 | Status: SHIPPED | OUTPATIENT
Start: 2024-07-25

## 2024-07-25 NOTE — PROGRESS NOTES
"Chief Complaint   Patient presents with    Med Management     1 year follow-up           History of Present Illness  54-year-old female presents today for follow-up.  Last visit 8/8/2023.  Initial consult February 3, 2021 for acid reflux.  EGD and colonoscopy performed April 19, 2021, given tubular adenomatous colon polyp, Dr. Ribera has recommended colonoscopy at 5-year interval, April 2026     She presents today for refills of acid medication.     Given nocturnal reflux and persistent epigastric burning she is on pantoprazole 40 mg twice daily.  In the past she has been able to decrease pantoprazole to once daily some days of the week however given worsening symptoms she continues on pantoprazole 40 mg twice daily at this time.    She had weight loss last year with semaglutide but discontinued this due GI side effects including abdominal pain, reflux and nausea.   She has regained some of the weight since discontinuing the medication.     Vital Signs:   /90   Pulse 77   Temp 97.7 °F (36.5 °C)   Ht 167.6 cm (66\")   Wt 94.3 kg (207 lb 14.4 oz)   SpO2 97%   BMI 33.56 kg/m²     Body mass index is 33.56 kg/m².     Physical Exam  Vitals reviewed.   Constitutional:       General: She is not in acute distress.     Appearance: Normal appearance. She is not ill-appearing or toxic-appearing.   Eyes:      General: No scleral icterus.  Pulmonary:      Effort: Pulmonary effort is normal. No respiratory distress.   Skin:     Coloration: Skin is not jaundiced.   Neurological:      Mental Status: She is alert and oriented to person, place, and time.   Psychiatric:         Mood and Affect: Mood normal.         Behavior: Behavior normal.         Thought Content: Thought content normal.         Judgment: Judgment normal.           Assessment and Plan    Diagnoses and all orders for this visit:    1. Personal history of colonic polyps (Primary)    2. Gastroesophageal reflux disease without esophagitis  -     pantoprazole " (PROTONIX) 40 MG EC tablet; Take 1 tablet by mouth 2 (Two) Times a Day.  Dispense: 180 tablet; Refill: 3    3. Hiatal hernia  -     pantoprazole (PROTONIX) 40 MG EC tablet; Take 1 tablet by mouth 2 (Two) Times a Day.  Dispense: 180 tablet; Refill: 3    4. Encounter for screening for malignant neoplasm of colon    For GERD, follow antireflux precautions.    Recommend avoiding eating within 3 to 4 hours of bedtime.    Avoid foods that can trigger symptoms which may include citrus fruits, spicy foods, tomatoes and red sauces, chocolate, coffee/tea, caffeinated or carbonated beverages, alcohol.     Refill of pantoprazole sent to pharmacy, recommend lowest dose of acid suppression possible to control symptoms    To consider repeat EGD at anytime due to symptoms otherwise would repeat at time of colonoscopy    Follow up visit yearly, sooner if symptoms change or condition warrants.     [facility] -  [address] [facility]     Discuss with weight loss clinician to see if maybe Tirzepatide would be better tolerated than semaglutide            Patient Instructions   For GERD, follow antireflux precautions.    Recommend avoiding eating within 3 to 4 hours of bedtime.    Avoid foods that can trigger symptoms which may include citrus fruits, spicy foods, tomatoes and red sauces, chocolate, coffee/tea, caffeinated or carbonated beverages, alcohol.     Refill of pantoprazole sent to pharmacy, recommend lowest dose of acid suppression possible to control symptoms    To consider repeat EGD at anytime due to symptoms otherwise would repeat at time of colonoscopy    Follow up visit yearly, sooner if symptoms change or condition warrants.     [facility] -    [address]       Discuss with weight loss clinician to see if maybe Tirzepatide would be better tolerated than         EMR Dragon/Transcription Disclaimer:  This document has  been Dictated utilizing Dragon dictation.

## 2024-07-25 NOTE — PATIENT INSTRUCTIONS
For GERD, follow antireflux precautions.    Recommend avoiding eating within 3 to 4 hours of bedtime.    Avoid foods that can trigger symptoms which may include citrus fruits, spicy foods, tomatoes and red sauces, chocolate, coffee/tea, caffeinated or carbonated beverages, alcohol.     Refill of pantoprazole sent to pharmacy, recommend lowest dose of acid suppression possible to control symptoms    To consider repeat EGD at anytime due to symptoms otherwise would repeat at time of colonoscopy    Follow up visit yearly, sooner if symptoms change or condition warrants.     [facility] -    [address] [facility]       Discuss with weight loss clinician to see if maybe Tirzepatide would be better tolerated than

## 2024-10-04 ENCOUNTER — OFFICE VISIT (OUTPATIENT)
Dept: FAMILY MEDICINE CLINIC | Facility: CLINIC | Age: 54
End: 2024-10-04
Payer: COMMERCIAL

## 2024-10-04 VITALS
TEMPERATURE: 98.4 F | WEIGHT: 209 LBS | DIASTOLIC BLOOD PRESSURE: 72 MMHG | HEART RATE: 80 BPM | OXYGEN SATURATION: 96 % | SYSTOLIC BLOOD PRESSURE: 110 MMHG | BODY MASS INDEX: 33.59 KG/M2 | HEIGHT: 66 IN

## 2024-10-04 DIAGNOSIS — R14.2 ERUCTATION: ICD-10-CM

## 2024-10-04 DIAGNOSIS — R14.3 FLATULENCE: ICD-10-CM

## 2024-10-04 DIAGNOSIS — K64.9 HEMORRHOIDS, UNSPECIFIED HEMORRHOID TYPE: Primary | ICD-10-CM

## 2024-10-04 DIAGNOSIS — E66.811 CLASS 1 OBESITY WITHOUT SERIOUS COMORBIDITY WITH BODY MASS INDEX (BMI) OF 33.0 TO 33.9 IN ADULT, UNSPECIFIED OBESITY TYPE: ICD-10-CM

## 2024-10-04 PROBLEM — E66.9 OBESITY: Status: ACTIVE | Noted: 2023-04-14

## 2024-10-04 PROBLEM — J30.9 ALLERGIC RHINITIS: Status: ACTIVE | Noted: 2023-04-14

## 2024-10-04 RX ORDER — HYDROCORTISONE 25 MG/G
CREAM TOPICAL 2 TIMES DAILY
Qty: 28 G | Refills: 3 | Status: SHIPPED | OUTPATIENT
Start: 2024-10-04

## 2024-10-04 RX ORDER — TRIAMCINOLONE ACETONIDE 55 UG/1
2 SPRAY, METERED NASAL DAILY
COMMUNITY

## 2024-10-04 NOTE — PROGRESS NOTES
Chief Complaint  Establish Care, Hemorrhoids, and Obesity    Subjective          History of Present Illness    Makenzie Justin 54 y.o. female presents today for a new patient appointment. She is here to establish care and is a new patient to me. I reviewed the PFSH recorded today by my MA/LPN staff.       Ms. Justin reports belching, gas, and an internal hemorrhoid. The hemorrhoid becomes swollen at times and is painful. She is established with Gastroenterology. She takes a probiotic every other day. No constipation. Bowel movements are once daily. Last BM was yesterday. She sometimes strains with bowel movements. No rectal bleeding or blood in stool. She has used OTC generic Preparation H with no improvement. She drinks 3 carbonated beverages per day. She does eat fatty foods with eating fast food due to a very busy schedule.     She has a hiatal hernia that is overall managed well with Pantoprazole twice daily. She will have reflux if she eats after 1830. Hernia is overall well-managed if she eats early.    Ms. Justin has obesity. She struggles with losing weight despite exercising.  This is mostly due to having a very hectic schedule. Therefore, she is not always able to eat a healthy diet. The patient is interested in Wegovy subcutaneous weekly injections. She denies a personal and family history of medullary thyroid cancer, multiple endocrine neoplasia syndrome type II, and pancreatitis. She was informed of the possible side effects and possible adverse reactions associated with Wegovy administration. She verbalized understanding of all information provided today, and stated she would like to proceed with the medication.         Review of Systems   Constitutional:  Negative for chills, fatigue and fever.   HENT:  Negative for congestion and sinus pressure.    Eyes:  Negative for visual disturbance.   Respiratory:  Negative for cough and shortness of breath.    Cardiovascular:  Negative for chest pain and  "palpitations.   Gastrointestinal:  Negative for abdominal pain, anal bleeding, blood in stool, constipation, diarrhea, nausea, rectal pain and vomiting.        Belching, flatus, hemorrhoid   Genitourinary:  Negative for dysuria, frequency and urgency.   Musculoskeletal:  Negative for back pain.   Skin:  Negative for rash.   Neurological:  Negative for dizziness, syncope and light-headedness.   Psychiatric/Behavioral:  Negative for behavioral problems and sleep disturbance.         Objective   Vital Signs:   /72 (BP Location: Left arm, Patient Position: Sitting, Cuff Size: Large Adult)   Pulse 80   Temp 98.4 °F (36.9 °C) (Oral)   Ht 167.6 cm (66\")   Wt 94.8 kg (209 lb)   SpO2 96%   BMI 33.73 kg/m²      BMI is >= 30 and <35. (Class 1 Obesity). The following options were offered after discussion;: exercise counseling/recommendations and nutrition counseling/recommendations       Physical Exam  Vitals and nursing note reviewed.   Constitutional:       General: She is not in acute distress.     Appearance: She is well-developed. She is obese.   HENT:      Head: Normocephalic and atraumatic.   Eyes:      General: No scleral icterus.     Conjunctiva/sclera: Conjunctivae normal.      Pupils: Pupils are equal, round, and reactive to light.   Neck:      Vascular: No carotid bruit.   Cardiovascular:      Rate and Rhythm: Normal rate and regular rhythm.      Heart sounds: Normal heart sounds. No murmur heard.     No gallop.   Pulmonary:      Effort: Pulmonary effort is normal. No respiratory distress.      Breath sounds: Normal breath sounds. No wheezing or rales.   Abdominal:      General: There is no distension.      Palpations: Abdomen is soft. Abdomen is not rigid. There is no mass.   Musculoskeletal:         General: No deformity. Normal range of motion.      Cervical back: Normal range of motion and neck supple.   Skin:     General: Skin is warm and dry.      Findings: No rash.   Neurological:      Mental " Status: She is alert and oriented to person, place, and time.   Psychiatric:         Mood and Affect: Mood normal.         Behavior: Behavior normal.                         Assessment and Plan      Assessment & Plan  Hemorrhoids, unspecified hemorrhoid type  Anusol cream as directed  Avoid prolonged sitting and straining with bowel movements  OTC Colace stool softener as needed  Increase water intake  Follow-up with established Gastroenterology provider if no improvement  Class 1 obesity without serious comorbidity with body mass index (BMI) of 33.0 to 33.9 in adult, unspecified obesity type  Patient's (Body mass index is 33.73 kg/m².) indicates that they are obese (BMI >30) with health conditions that include GERD . Weight is newly identified. BMI  is above average; BMI management plan is completed. We discussed portion control, increasing exercise, and pharmacologic options including Wegovy .   Eructation  Decrease carbonated beverages  Follow-up with established Gastroenterologist if no improvement  Flatulence  Start a low-fat diet  Follow-up with established Gastroenterologist if no improvement    Orders Placed This Encounter   Procedures    Comprehensive metabolic panel    Lipid panel    TSH    Hemoglobin A1c    CBC and Differential     New Medications Ordered This Visit   Medications    Hydrocortisone, Perianal, (ANUSOL-HC) 2.5 % rectal cream     Sig: Insert  into the rectum 2 (Two) Times a Day.     Dispense:  28 g     Refill:  3    Semaglutide-Weight Management 0.25 MG/0.5ML solution auto-injector     Sig: Inject 0.5 mL under the skin into the appropriate area as directed 1 (One) Time Per Week.     Dispense:  2 mL     Refill:  1    Semaglutide-Weight Management 0.5 MG/0.5ML solution auto-injector     Sig: Inject 0.5 mL under the skin into the appropriate area as directed 1 (One) Time Per Week.     Dispense:  2 mL     Refill:  1              Follow Up     Return in about 4 weeks (around 11/1/2024) for Next  scheduled follow up.    Patient was given instructions and counseling regarding her condition or for health maintenance advice. Please see specific information pulled into the AVS if appropriate.

## 2024-10-04 NOTE — ASSESSMENT & PLAN NOTE
Patient's (Body mass index is 33.73 kg/m².) indicates that they are obese (BMI >30) with health conditions that include GERD . Weight is newly identified. BMI  is above average; BMI management plan is completed. We discussed portion control, increasing exercise, and pharmacologic options including Wegovy.

## 2024-10-12 DIAGNOSIS — K21.9 GASTROESOPHAGEAL REFLUX DISEASE WITHOUT ESOPHAGITIS: ICD-10-CM

## 2024-10-12 DIAGNOSIS — K44.9 HIATAL HERNIA: ICD-10-CM

## 2024-10-14 RX ORDER — DESVENLAFAXINE 25 MG/1
25 TABLET, EXTENDED RELEASE ORAL DAILY
Qty: 90 TABLET | Refills: 1 | Status: SHIPPED | OUTPATIENT
Start: 2024-10-14

## 2024-10-14 RX ORDER — PANTOPRAZOLE SODIUM 40 MG/1
40 TABLET, DELAYED RELEASE ORAL 2 TIMES DAILY
Qty: 180 TABLET | Refills: 3 | Status: SHIPPED | OUTPATIENT
Start: 2024-10-14

## 2024-10-19 LAB
ALBUMIN SERPL-MCNC: 4.2 G/DL (ref 3.5–5.2)
ALBUMIN/GLOB SERPL: 1.5 G/DL
ALP SERPL-CCNC: 150 U/L (ref 39–117)
ALT SERPL-CCNC: 26 U/L (ref 1–33)
AST SERPL-CCNC: 25 U/L (ref 1–32)
BASOPHILS # BLD AUTO: 0.03 10*3/MM3 (ref 0–0.2)
BASOPHILS NFR BLD AUTO: 0.4 % (ref 0–1.5)
BILIRUB SERPL-MCNC: 0.5 MG/DL (ref 0–1.2)
BUN SERPL-MCNC: 10 MG/DL (ref 6–20)
BUN/CREAT SERPL: 8.3 (ref 7–25)
CALCIUM SERPL-MCNC: 9.4 MG/DL (ref 8.6–10.5)
CHLORIDE SERPL-SCNC: 105 MMOL/L (ref 98–107)
CHOLEST SERPL-MCNC: 211 MG/DL (ref 0–200)
CO2 SERPL-SCNC: 29.2 MMOL/L (ref 22–29)
CREAT SERPL-MCNC: 1.2 MG/DL (ref 0.57–1)
EGFRCR SERPLBLD CKD-EPI 2021: 54.6 ML/MIN/1.73
EOSINOPHIL # BLD AUTO: 0.11 10*3/MM3 (ref 0–0.4)
EOSINOPHIL NFR BLD AUTO: 1.6 % (ref 0.3–6.2)
ERYTHROCYTE [DISTWIDTH] IN BLOOD BY AUTOMATED COUNT: 13.2 % (ref 12.3–15.4)
GLOBULIN SER CALC-MCNC: 2.8 GM/DL
GLUCOSE SERPL-MCNC: 125 MG/DL (ref 65–99)
HBA1C MFR BLD: 5.7 % (ref 4.8–5.6)
HCT VFR BLD AUTO: 41.6 % (ref 34–46.6)
HDLC SERPL-MCNC: 45 MG/DL (ref 40–60)
HGB BLD-MCNC: 14.3 G/DL (ref 12–15.9)
IMM GRANULOCYTES # BLD AUTO: 0.03 10*3/MM3 (ref 0–0.05)
IMM GRANULOCYTES NFR BLD AUTO: 0.4 % (ref 0–0.5)
LDLC SERPL CALC-MCNC: 148 MG/DL (ref 0–100)
LYMPHOCYTES # BLD AUTO: 1.18 10*3/MM3 (ref 0.7–3.1)
LYMPHOCYTES NFR BLD AUTO: 16.7 % (ref 19.6–45.3)
MCH RBC QN AUTO: 29.3 PG (ref 26.6–33)
MCHC RBC AUTO-ENTMCNC: 34.4 G/DL (ref 31.5–35.7)
MCV RBC AUTO: 85.2 FL (ref 79–97)
MONOCYTES # BLD AUTO: 0.49 10*3/MM3 (ref 0.1–0.9)
MONOCYTES NFR BLD AUTO: 6.9 % (ref 5–12)
NEUTROPHILS # BLD AUTO: 5.24 10*3/MM3 (ref 1.7–7)
NEUTROPHILS NFR BLD AUTO: 74 % (ref 42.7–76)
NRBC BLD AUTO-RTO: 0 /100 WBC (ref 0–0.2)
PLATELET # BLD AUTO: 319 10*3/MM3 (ref 140–450)
POTASSIUM SERPL-SCNC: 4.5 MMOL/L (ref 3.5–5.2)
PROT SERPL-MCNC: 7 G/DL (ref 6–8.5)
RBC # BLD AUTO: 4.88 10*6/MM3 (ref 3.77–5.28)
SODIUM SERPL-SCNC: 143 MMOL/L (ref 136–145)
TRIGL SERPL-MCNC: 101 MG/DL (ref 0–150)
TSH SERPL DL<=0.005 MIU/L-ACNC: 2.28 UIU/ML (ref 0.27–4.2)
VLDLC SERPL CALC-MCNC: 18 MG/DL (ref 5–40)
WBC # BLD AUTO: 7.08 10*3/MM3 (ref 3.4–10.8)

## 2024-10-20 DIAGNOSIS — N28.9 ABNORMAL KIDNEY FUNCTION: Primary | ICD-10-CM

## 2024-10-20 DIAGNOSIS — R74.8 ALKALINE PHOSPHATASE ELEVATION: ICD-10-CM

## 2024-11-19 ENCOUNTER — OFFICE VISIT (OUTPATIENT)
Dept: GASTROENTEROLOGY | Facility: CLINIC | Age: 54
End: 2024-11-19
Payer: COMMERCIAL

## 2024-11-19 VITALS
BODY MASS INDEX: 33.82 KG/M2 | DIASTOLIC BLOOD PRESSURE: 80 MMHG | WEIGHT: 210.4 LBS | OXYGEN SATURATION: 95 % | SYSTOLIC BLOOD PRESSURE: 130 MMHG | HEART RATE: 82 BPM | HEIGHT: 66 IN | TEMPERATURE: 96.1 F

## 2024-11-19 DIAGNOSIS — K60.2 ANAL FISSURE: Primary | ICD-10-CM

## 2024-11-19 PROCEDURE — 99214 OFFICE O/P EST MOD 30 MIN: CPT | Performed by: NURSE PRACTITIONER

## 2024-11-19 NOTE — PROGRESS NOTES
"Chief Complaint   Patient presents with    Rectal Pain         History of Present Illness  Patient is a 54-year-old female who presents today for evaluation with concerns about hemorrhoids.  She has a history of colon polyps and GERD.    Patient reports she has had intermittent issues with hemorrhoids over the years.  The pain would generally come and go but over the last 3 to 4 months she has had worsening symptoms and has had persistent rectal pain and occasional blood in the stool.    Her bowels move regularly, generally twice per day.  Takes magnesium and stool is generally soft and easy to pass.  The discomfort can be worse after a bowel movement.    She has been using a hydrocortisone cream with no improvement in symptoms.    She denies any abdominal pain or straining.    She continues on pantoprazole for GERD, reports she has had ongoing symptoms.     Result Review :       Tissue Pathology Exam (04/19/2021 10:41)    UPPER GI ENDOSCOPY (04/19/2021 10:29)    COLONOSCOPY (04/19/2021 10:29)    Office Visit with Yoko Vigil APRN (07/25/2024)    Vital Signs:   /80   Pulse 82   Temp 96.1 °F (35.6 °C)   Ht 167.6 cm (65.98\")   Wt 95.4 kg (210 lb 6.4 oz)   SpO2 95%   BMI 33.98 kg/m²     Body mass index is 33.98 kg/m².     Physical Exam  Vitals reviewed.   Constitutional:       General: She is not in acute distress.     Appearance: She is well-developed.   HENT:      Head: Normocephalic and atraumatic.   Pulmonary:      Effort: Pulmonary effort is normal. No respiratory distress.   Abdominal:      General: Abdomen is flat. Bowel sounds are normal. There is no distension.      Palpations: Abdomen is soft.      Tenderness: There is no abdominal tenderness.   Genitourinary:     Rectum: Anal fissure present.      Comments: Anterior anal fissure present  Skin:     General: Skin is dry.      Coloration: Skin is not pale.   Neurological:      Mental Status: She is alert and oriented to person, place, and " time.   Psychiatric:         Thought Content: Thought content normal.           Assessment and Plan    Diagnoses and all orders for this visit:    1. Anal fissure (Primary)         Discussion  Patient presents today for evaluation with concerns about rectal pain.  On exam, she does have evidence of an anterior anal fissure which is felt to be source of her symptoms.  Recommended discontinue hydrocortisone cream and will start diltiazem 2% with lidocaine 3% ointment.  If symptoms do not improve she was instructed to notify the office and could consider treatment with nitroglycerin paste ointment or referral to colorectal surgery to discuss Botox or surgical management of fissure.          Follow Up   Return if symptoms worsen or fail to improve.    Patient Instructions   For anal fissure, use diltiazem/lidocaine ointment 3 times daily for 6 weeks.    Prescription sent to:  [facility]  [address]  [place] [address]  Phone: [phone]  Fax: [phone]    Recommend keeping stool soft and easy to pass.  If needed, can start a stool softener such as Colace.

## 2024-11-19 NOTE — PATIENT INSTRUCTIONS
For anal fissure, use diltiazem/lidocaine ointment 3 times daily for 6 weeks.    Prescription sent to:  [facility]  [address]  [place] [address]  Phone: [phone]  Fax: [phone]    Recommend keeping stool soft and easy to pass.  If needed, can start a stool softener such as Colace.

## 2025-02-25 ENCOUNTER — OFFICE VISIT (OUTPATIENT)
Dept: OBSTETRICS AND GYNECOLOGY | Age: 55
End: 2025-02-25
Payer: COMMERCIAL

## 2025-02-25 ENCOUNTER — HOSPITAL ENCOUNTER (OUTPATIENT)
Facility: HOSPITAL | Age: 55
Discharge: HOME | End: 2025-02-25
Admitting: OBSTETRICS & GYNECOLOGY
Payer: COMMERCIAL

## 2025-02-25 VITALS
WEIGHT: 209 LBS | SYSTOLIC BLOOD PRESSURE: 124 MMHG | BODY MASS INDEX: 33.59 KG/M2 | HEIGHT: 66 IN | DIASTOLIC BLOOD PRESSURE: 84 MMHG

## 2025-02-25 DIAGNOSIS — N95.1 MENOPAUSAL SYMPTOMS: ICD-10-CM

## 2025-02-25 DIAGNOSIS — Z12.31 SCREENING MAMMOGRAM FOR BREAST CANCER: ICD-10-CM

## 2025-02-25 DIAGNOSIS — Z11.51 SCREENING FOR HUMAN PAPILLOMAVIRUS (HPV): ICD-10-CM

## 2025-02-25 DIAGNOSIS — Z12.4 SCREENING FOR MALIGNANT NEOPLASM OF CERVIX: ICD-10-CM

## 2025-02-25 DIAGNOSIS — Z01.419 WELL FEMALE EXAM WITH ROUTINE GYNECOLOGICAL EXAM: ICD-10-CM

## 2025-02-25 DIAGNOSIS — Z01.419 ENCOUNTER FOR GYNECOLOGICAL EXAMINATION WITHOUT ABNORMAL FINDING: Primary | ICD-10-CM

## 2025-02-25 PROCEDURE — 77063 BREAST TOMOSYNTHESIS BI: CPT

## 2025-02-25 PROCEDURE — 77067 SCR MAMMO BI INCL CAD: CPT

## 2025-02-25 NOTE — PROGRESS NOTES
Routine Annual Visit    2025    Patient: Makenzie Justin          MR#:[id number]      Chief Complaint   Patient presents with    Gynecologic Exam     Annual Exam - last pap 22 neg, mammo today, colonoscopy 21, mirena IUD inserted 17, discuss removal/reinsert, pt has no complaints today       History of Present Illness    54 y.o. female  who presents for annual exam.     Patient is feeling well  She does not have periods  Her IUD is  this may  She would like to get another IUD  We will authorize her for Kyleena IUD  She has no hot flashes or night sweats  We will check a FSH and a TSH to see if she is menopausal just based on her age  She has 4 kids and they are doing well  Oldest to her in college  Shin is at [employer] and playing golf and read is playing soccer  Celi will be a senior at [employer] next year  She plays soccer  Jovanny is also in high school and doing well  Mammogram today  Colonoscopy is up-to-date  Pap smear is due  No other complaints      No LMP recorded. Patient has had an implant.  Obstetric History:  OB History          6    Para   1    Term   1            AB   2    Living   4         SAB   1    IAB        Ectopic        Molar        Multiple        Live Births   4               Menstrual History:     No LMP recorded. Patient has had an implant.       Sexual History:       ________________________________________  Patient Active Problem List   Diagnosis    Dysthymia    Hyperglycemia    Gastroesophageal reflux disease without esophagitis    Ophthalmic migraine    Encounter for screening for malignant neoplasm of colon    Allergic rhinitis    Obesity       Past Medical History:   Diagnosis Date    Allergic rhinitis     Anxiety     Depression     GERD (gastroesophageal reflux disease)     History of miscarriage     IUD (intrauterine device) in place 2017    Lilletta    Menorrhagia     PMS (premenstrual syndrome)     Stress at home     Stress  "at work     Vaginal delivery        Past Surgical History:   Procedure Laterality Date    COLONOSCOPY N/A 04/19/2021    Procedure: COLONOSCOPY;  Surgeon: Hossein Ribera MD;  [place] [facility] [place];  Service: Gastroenterology;  Laterality: N/A;    DILATATION AND CURETTAGE      ENDOSCOPY N/A 04/19/2021    Procedure: ESOPHAGOGASTRODUODENOSCOPY;  Surgeon: Hossein Ribera MD;  [place] [facility] [place];  Service: Gastroenterology;  Laterality: N/A;    UPPER GASTROINTESTINAL ENDOSCOPY         Social History     Tobacco Use   Smoking Status Never    Passive exposure: Never   Smokeless Tobacco Never       has a current medication list which includes the following prescription(s): desvenlafaxine succinate er, hydrocortisone (perianal), levocetirizine, magnesium, montelukast, and pantoprazole, and the following Facility-Administered Medications: levonorgestrel.  ________________________________________    Current contraception: IUD  History of abnormal Pap smear: no  Family history of Breast cancer: no  Family history of uterine or ovarian cancer: no  Family History of colon cancer/colon polyps: no  History of abnormal mammogram: no      The following portions of the patient's history were reviewed and updated as appropriate: allergies, current medications, past family history, past medical history, past social history, past surgical history, and problem list.    Review of Systems    Pertinent items are noted in HPI.     Objective   Physical Exam    /84 (BP Location: Left arm, Patient Position: Sitting)   Ht 167.6 cm (65.98\")   Wt 94.8 kg (209 lb)   BMI 33.75 kg/m²    BP Readings from Last 3 Encounters:   02/25/25 124/84   11/19/24 130/80   10/04/24 110/72      Wt Readings from Last 3 Encounters:   02/25/25 94.8 kg (209 lb)   11/19/24 95.4 kg (210 lb 6.4 oz)   10/04/24 94.8 kg (209 lb)      BMI: Estimated body mass index is 33.75 kg/m² as calculated from the following:    Height as of this encounter: 167.6 cm " "(65.98\").    Weight as of this encounter: 94.8 kg (209 lb).      General:   alert, appears stated age, and cooperative   Abdomen: soft, non-tender, without masses or organomegaly   Breast: inspection negative, no nipple discharge or bleeding, no masses or nodularity palpable   Vulva: normal, Bartholin's, Urethra, Racine's normal   Vagina: normal mucosa   Cervix: no cervical motion tenderness and no lesions   Uterus: normal size, mobile, and non-tender   Adnexa: no mass, fullness, tenderness     Assessment:    1. Normal annual exam   Assessment     ICD-10-CM ICD-9-CM   1. Encounter for gynecological examination without abnormal finding  Z01.419 V72.31   2. Well female exam with routine gynecological exam  Z01.419 V72.31   3. Screening for human papillomavirus (HPV)  Z11.51 V73.81   4. Screening for malignant neoplasm of cervix  Z12.4 V76.2   5. Screening mammogram for breast cancer  Z12.31 V76.12   6. Menopausal symptoms  N95.1 627.2     Plan:    Plan     [x]  Mammogram request made  [x]  PAP done  []  Labs:   []  GC/Chl/TV  []  DEXA scan   []  Referral for colonoscopy:       Diagnoses and all orders for this visit:    1. Encounter for gynecological examination without abnormal finding (Primary)    2. Well female exam with routine gynecological exam  -     IGP, Apt HPV,rfx 16 / 18,45    3. Screening for human papillomavirus (HPV)  -     IGP, Apt HPV,rfx 16 / 18,45    4. Screening for malignant neoplasm of cervix  -     IGP, Apt HPV,rfx 16 / 18,45    5. Screening mammogram for breast cancer  -     Mammo Screening Digital Tomosynthesis Bilateral With CAD; Future    6. Menopausal symptoms  -     Follicle Stimulating Hormone  -     TSH            Counseling:  --Nutrition: Stressed importance of moderation and caloric balance, stressed fresh fruit and vegetables  --Exercise: Stressed the importance of regular exercise. 3-5 times weekly   - Discussed screening mammogram recommendations.   --Discussed benefits of screening " colonoscopy- age 45 unless FH  --Discussed pap smear screening recommendations

## 2025-02-26 LAB
FSH SERPL-ACNC: 75.1 MIU/ML
TSH SERPL DL<=0.005 MIU/L-ACNC: 2.96 UIU/ML (ref 0.27–4.2)

## 2025-03-03 LAB
CYTOLOGIST CVX/VAG CYTO: NORMAL
CYTOLOGY CVX/VAG DOC CYTO: NORMAL
CYTOLOGY CVX/VAG DOC THIN PREP: NORMAL
DX ICD CODE: NORMAL
HPV I/H RISK 4 DNA CVX QL PROBE+SIG AMP: NEGATIVE
OTHER STN SPEC: NORMAL
SERVICE CMNT-IMP: NORMAL
STAT OF ADQ CVX/VAG CYTO-IMP: NORMAL

## 2025-04-11 RX ORDER — DESVENLAFAXINE 25 MG/1
25 TABLET, EXTENDED RELEASE ORAL DAILY
Qty: 90 TABLET | Refills: 1 | Status: SHIPPED | OUTPATIENT
Start: 2025-04-11

## 2025-05-06 ENCOUNTER — TELEPHONE (OUTPATIENT)
Dept: OBSTETRICS AND GYNECOLOGY | Age: 55
End: 2025-05-06
Payer: COMMERCIAL

## 2025-05-06 NOTE — TELEPHONE ENCOUNTER
Pt has IUD removal & reinsertion scheduled 5/30/25. Pt calling stating she had an appointment with her internist & was recommended to have Mirena IUD inserted instead of Kyleena. Pt stating her internist stated she likes the idea of the extra hormone the Mirena gives vs the Kyleena. Pt wanted to let you know & would like benefits ran for Mirena IUD instead. Sending as FYI & will forward to Jody as well.

## 2025-05-30 ENCOUNTER — OFFICE VISIT (OUTPATIENT)
Dept: OBSTETRICS AND GYNECOLOGY | Age: 55
End: 2025-05-30
Payer: COMMERCIAL

## 2025-05-30 VITALS
DIASTOLIC BLOOD PRESSURE: 82 MMHG | SYSTOLIC BLOOD PRESSURE: 126 MMHG | HEIGHT: 66 IN | WEIGHT: 205 LBS | BODY MASS INDEX: 32.95 KG/M2

## 2025-05-30 DIAGNOSIS — Z30.433 ENCOUNTER FOR IUD REMOVAL AND REINSERTION: Primary | ICD-10-CM

## 2025-05-30 DIAGNOSIS — N92.6 IRREGULAR MENSES: ICD-10-CM

## 2025-05-30 LAB
B-HCG UR QL: NEGATIVE
EXPIRATION DATE: NORMAL
INTERNAL NEGATIVE CONTROL: NORMAL
INTERNAL POSITIVE CONTROL: NORMAL
Lab: NORMAL

## 2025-05-30 RX ORDER — FERROUS SULFATE 325(65) MG
325 TABLET ORAL
COMMUNITY

## 2025-05-30 NOTE — PROGRESS NOTES
GYN Visit    2025    Patient: Makenzie Justin          MR#:[id number]      Chief Complaint   Patient presents with    Follow-up     Gyn F/u - IUD removal & Mirena Reinsertion (B&B)       History of Present Illness    55 y.o. female  who presents for IUD removal and reinsert    Patient's internist suggested the Mirena IUD and so that is what we are going to place today        No LMP recorded. Patient has had an implant.    ________________________________________  Patient Active Problem List   Diagnosis    Dysthymia    Hyperglycemia    Gastroesophageal reflux disease without esophagitis    Ophthalmic migraine    Encounter for screening for malignant neoplasm of colon    Allergic rhinitis    Obesity       Past Medical History:   Diagnosis Date    Allergic rhinitis     Anxiety     Colon polyp Last colonoscopy    Depression     GERD (gastroesophageal reflux disease)     Hernia Last endoscopy    History of miscarriage     IUD (intrauterine device) in place 2017    Lilletta    Lactose intolerance     Menorrhagia     PMS (premenstrual syndrome)     Stress at home     Stress at work     Vaginal delivery        Past Surgical History:   Procedure Laterality Date    COLONOSCOPY N/A 2021    Procedure: COLONOSCOPY;  Surgeon: Hossein Ribera MD;  [place] [facility] [place];  Service: Gastroenterology;  Laterality: N/A;    D & C WITH SUCTION  2003    DILATATION AND CURETTAGE      ENDOSCOPY N/A 2021    Procedure: ESOPHAGOGASTRODUODENOSCOPY;  Surgeon: Hossein Ribera MD;  [place]: [facility] [place] [facility];  Service: Gastroenterology;  Laterality: N/A;    UPPER GASTROINTESTINAL ENDOSCOPY         Social History     Tobacco Use   Smoking Status Never    Passive exposure: Never   Smokeless Tobacco Never       has a current medication list which includes the following prescription(s): desvenlafaxine succinate er, ferrous sulfate, hydrocortisone (perianal), levocetirizine, mirena (52 mg), magnesium,  "montelukast, pantoprazole, and kyleena, and the following Facility-Administered Medications: levonorgestrel.  ________________________________________    Current contraception: IUD      The following portions of the patient's history were reviewed and updated as appropriate: allergies, current medications, past family history, past medical history, past social history, past surgical history, and problem list.    Review of Systems    Pertinent items are noted in HPI.     Objective   Physical Exam    /82 (BP Location: Left arm, Patient Position: Sitting)   Ht 167.6 cm (66\")   Wt 93 kg (205 lb)   BMI 33.09 kg/m²    BP Readings from Last 3 Encounters:   05/30/25 126/82   02/25/25 124/84   11/19/24 130/80      Wt Readings from Last 3 Encounters:   05/30/25 93 kg (205 lb)   02/25/25 94.8 kg (209 lb)   11/19/24 95.4 kg (210 lb 6.4 oz)      BMI: Estimated body mass index is 33.09 kg/m² as calculated from the following:    Height as of this encounter: 167.6 cm (66\").    Weight as of this encounter: 93 kg (205 lb).    Procedures  IUD Removal and Insertion Procedure Note    IUD Removal    Type of IUD:  Kyleena   Date of insertion:  known  Reason for removal:  Device expiration  Other relevant history/information:  none    Procedure Time Documentation  The risks of the procedure were reviewed with the patient including bleeding, infection and unlikely damage to the uterus and the benefits of the procedure were explained to the patient and Written informed consent was obtained    Procedure Details  IUD strings visible:  yes  Local anesthesia:  None  Tenaculum used:  None  Removal:  IUD strings grasped and IUD removed intact with gentle traction.  The patient tolerated the procedure well.    IUD Insertion    Indication: irregular menses    Procedure Details   Urine pregnancy test was done and was NEGATIVE .  The risks (including infection, bleeding, pain, and uterine perforation) and benefits of the procedure were " explained to the patient and Written informed consent was obtained.      Cervix cleansed with Betadine. Uterus sounded to 8 cm. IUD inserted without difficulty. String visible and trimmed.    IUD Information:  Mirena.    Condition:  Stable    Complications:  None noted and Patient tolerated the procedure well without complications.    Plan:  The patient was advised to call for any fever or for prolonged or severe pain or bleeding. She was advised to use OTC ibuprofen as needed for mild to moderate pain.     Attending Physician Documentation:  I was present for the entire procedure.    Daria Castellanos MD  5/30/2025 13:26 EDT        Assessment:      Diagnoses and all orders for this visit:    1. Encounter for IUD removal and reinsertion (Primary)  -     POC Pregnancy, Urine    2. Irregular menses  -     Levonorgestrel (MIRENA) 20 MCG/DAY IUD intrauterine device 1 each      Patient desires Mirena for birth control as well as control of perimenopausal bleeding  Tolerated insertion well  Follow-up 4 weeks for string check

## 2025-07-07 ENCOUNTER — OFFICE VISIT (OUTPATIENT)
Dept: GASTROENTEROLOGY | Facility: CLINIC | Age: 55
End: 2025-07-07
Payer: COMMERCIAL

## 2025-07-07 VITALS
DIASTOLIC BLOOD PRESSURE: 84 MMHG | OXYGEN SATURATION: 96 % | SYSTOLIC BLOOD PRESSURE: 128 MMHG | HEART RATE: 86 BPM | TEMPERATURE: 98 F | WEIGHT: 203.8 LBS | HEIGHT: 66 IN | BODY MASS INDEX: 32.75 KG/M2

## 2025-07-07 DIAGNOSIS — K44.9 HIATAL HERNIA: ICD-10-CM

## 2025-07-07 DIAGNOSIS — Z12.11 ENCOUNTER FOR SCREENING FOR MALIGNANT NEOPLASM OF COLON: ICD-10-CM

## 2025-07-07 DIAGNOSIS — Z86.0100 HISTORY OF COLON POLYPS: ICD-10-CM

## 2025-07-07 DIAGNOSIS — K29.60 EROSIVE GASTRITIS: Primary | ICD-10-CM

## 2025-07-07 PROCEDURE — 99214 OFFICE O/P EST MOD 30 MIN: CPT | Performed by: NURSE PRACTITIONER

## 2025-07-07 RX ORDER — VONOPRAZAN FUMARATE 26.72 MG/1
20 TABLET ORAL DAILY
Qty: 56 TABLET | Refills: 0 | Status: SHIPPED | OUTPATIENT
Start: 2025-07-07

## 2025-07-07 NOTE — PROGRESS NOTES
Chief Complaint   Patient presents with    Follow-up     Acid reflux, needs refills             GASTROENTEROLOGY SUMMARY  55-year-old female presents today for follow-up.  Last visit 11/19/2024.  Last EGD and colonoscopy 4/19/2021.  History of tubular adenomatous colon polyps, Dr. Ribera recommends colonoscopy at 5-year interval, April 2026.    Longstanding acid reflux, history of tubular adenomatous colon polyps, history of anal fissure and hiatal hernia.    History of Present Illness  The patient is a 55-year-old female who presents today for an annual follow-up.    Anal Fissure  She reports that her anal fissure, treated on 11/19/2024, has healed well with no current issues.    Low Iron Levels  She has been experiencing low iron levels, which have persisted despite being on iron supplements for 3 months.   She continues to feel fatigued and believes her iron levels remain low.   She has not had a menstrual period in 15 years and recently had her IUD replaced. She has not undergone any bariatric surgery.   She takes Advil, Aleve, or ibuprofen infrequently, approximately once a month.   Her liver function tests have been abnormal for the past 5 days.   She is scheduled to have her iron levels rechecked at the end of July 2025.   \She is not receiving iron infusions.  - Onset: Low iron levels persisting despite supplements for 3 months.  - Duration: 3 months.  - Character: Fatigue, low iron levels.  - Timing: Scheduled to have iron levels rechecked at the end of July 2025.    Tirzepatide Injections and Reflux  She has been on tirzepatide injections for 2 months, administered in her leg, and reports a decrease in side effects.   However, she still experiences severe reflux.   She takes pantoprazole twice daily and famotidine at 2:00 PM.   She occasionally experiences regurgitation.   She has tried Nexium and omeprazole in the past without success.   She is concerned about the potential impact of her constant reflux  "on her esophagus. She does not feel full easily and has been making healthier food choices over the past 6 months. She has not eaten anything today and does not feel hungry.   She typically eats a piece of fruit and a protein bar when school is in session.   She does not usually eat breakfast.  - Onset: On tirzepatide injections for 2 months.  - Location: Administered in her leg.  - Character: Severe reflux, occasional regurgitation.  - Alleviating/Aggravating Factors: Pantoprazole twice daily, famotidine at 2:00 PM; Nexium and omeprazole were unsuccessful.  - Timing: Constant reflux.  - Severity: Concerned about the impact on her esophagus.    Increased Bowel Movements  She reports increased bowel movements due to the iron supplements.   She takes a gummy vitamin supplement every other day and magnesium nightly, which she finds helpful.   She feels she is able to empty her bowels well.  - Onset: Increased bowel movements due to iron supplements.  - Alleviating Factors: Gummy vitamin supplement every other day, magnesium nightly.  - Severity: Able to empty bowels well.    SOCIAL HISTORY  Diet: Consumes greens gummy vitamins and magnesium daily.     Vital Signs:   /84   Pulse 86   Temp 98 °F (36.7 °C)   Ht 167.6 cm (66\")   Wt 92.4 kg (203 lb 12.8 oz)   SpO2 96%   BMI 32.89 kg/m²     Body mass index is 32.89 kg/m².     Physical Exam  Vitals reviewed.   Constitutional:       General: She is not in acute distress.     Appearance: Normal appearance. She is not ill-appearing or toxic-appearing.   Eyes:      General: No scleral icterus.  Pulmonary:      Effort: Pulmonary effort is normal. No respiratory distress.   Skin:     Coloration: Skin is not jaundiced.   Neurological:      Mental Status: She is alert and oriented to person, place, and time.   Psychiatric:         Mood and Affect: Mood normal.         Behavior: Behavior normal.         Thought Content: Thought content normal.         Judgment: Judgment " normal.         Assessment and Plan        Diagnoses and all orders for this visit:    1. Erosive gastritis (Primary)    2. Encounter for screening for malignant neoplasm of colon    3. History of colon polyps    4. Hiatal hernia    Other orders  -     Vonoprazan Fumarate (Voquezna) 20 MG tablet; Take 1 tablet by mouth Daily. Failed pantoprazole 40, lansoprazole 30, esomeprazole 40, omeprazole 40, Pepcid 40  Dispense: 56 tablet; Refill: 0       Assessment & Plan  1. Iron Deficiency:  - Obtain lab results from Dr. Price for further evaluation.  - If iron levels remain low despite supplementation, consider endoscopy sooner to investigate potential causes such as erosive gastritis.    2. Gastroesophageal Reflux Disease (GERD):  - Submit prior authorization for Voquezna and start medication once daily.  - Continue taking Pepcid if needed; discontinue pantoprazole.  - Implement dietary modifications, including smaller meals and avoiding eating 3 hours before bedtime.  - Further adjustments will be considered if symptoms persist.    3. Fatty Liver:  - Monitor liver function tests regularly.    4. Weight Management:  - Continue tirzepatide injections with current dose and administration in the leg to minimize side effects.  - Adjust dose slowly if necessary.    5.  History of adenomatous colon polyps/  health Maintenance:  - Recommended for colonoscopy 04/2026 or consider earlier during fall break.  - Plan upper endoscopy based on lab results and symptoms.      Follow-up:  - 07/2026 or sooner if necessary.             Patient Instructions   History of tubular adenomatous colon polyps, Dr. Ribera recommends colonoscopy at 5-year interval, April 2026.    [facility] labs and if needed we can perform EGD and colonoscopy sooner, will also reach out to Dr Price to discuss    Stop pantoprazole    Start voquenza 20 mg once daily    Ok to take pepcid if needed    Make sure small frequent meals, avoid eating 3 hours before  bedtime    Yearly follow up, sooner if needed    Adjust/monitor tripeptide with Dr Price          Patient or patient representative verbalized consent for the use of Ambient Listening during the visit with  YELITZA Castro for chart documentation. 7/14/2025  13:05 EDT    EMR Dragon/Transcription Disclaimer:  This document has been Dictated utilizing Dragon dictation.

## 2025-07-07 NOTE — PATIENT INSTRUCTIONS
History of tubular adenomatous colon polyps, Dr. Ribera recommends colonoscopy at 5-year interval, April 2026.    [facility] labs and if needed we can perform EGD and colonoscopy sooner, will also reach out to Dr Price to discuss    Stop pantoprazole    Start voquenza 20 mg once daily    Ok to take pepcid if needed    Make sure small frequent meals, avoid eating 3 hours before bedtime    Yearly follow up, sooner if needed    Adjust/monitor tripeptide with Dr Price

## 2025-08-27 DIAGNOSIS — K29.60 EROSIVE GASTRITIS: Primary | ICD-10-CM

## 2025-08-27 RX ORDER — VONOPRAZAN FUMARATE 26.72 MG/1
20 TABLET ORAL DAILY
Qty: 56 TABLET | Refills: 0 | Status: CANCELLED | OUTPATIENT
Start: 2025-08-27

## 2025-08-28 RX ORDER — VONOPRAZAN FUMARATE 13.36 MG/1
10 TABLET ORAL DAILY
Qty: 90 TABLET | Refills: 1 | Status: SHIPPED | OUTPATIENT
Start: 2025-08-28

## (undated) DEVICE — VIAL FORMLN CAP 10PCT 20ML

## (undated) DEVICE — Device

## (undated) DEVICE — KT ORCA ORCAPOD DISP STRL

## (undated) DEVICE — GOWN PROC ENDOARMOR GI LVL3 HY/SHLD UNIV

## (undated) DEVICE — BITEBLOCK OMNI BLOC

## (undated) DEVICE — MSK ENDO PORT O2 POM ELITE CURAPLEX A/

## (undated) DEVICE — FLEX ADVANTAGE 1500CC: Brand: FLEX ADVANTAGE

## (undated) DEVICE — SINGLE-USE BIOPSY FORCEPS: Brand: RADIAL JAW 4

## (undated) DEVICE — GOWN ISOL W/THUMB UNIV BLU BX/15